# Patient Record
Sex: FEMALE | Race: ASIAN | NOT HISPANIC OR LATINO | Employment: FULL TIME | URBAN - METROPOLITAN AREA
[De-identification: names, ages, dates, MRNs, and addresses within clinical notes are randomized per-mention and may not be internally consistent; named-entity substitution may affect disease eponyms.]

---

## 2018-07-11 ENCOUNTER — OFFICE VISIT (OUTPATIENT)
Dept: OPHTHALMOLOGY | Facility: CLINIC | Age: 63
End: 2018-07-11
Payer: COMMERCIAL

## 2018-07-11 DIAGNOSIS — H25.12 NUCLEAR SCLEROSIS OF LEFT EYE: Primary | ICD-10-CM

## 2018-07-11 PROCEDURE — 92004 COMPRE OPH EXAM NEW PT 1/>: CPT | Mod: S$GLB,,, | Performed by: OPHTHALMOLOGY

## 2018-07-11 PROCEDURE — 99999 PR PBB SHADOW E&M-NEW PATIENT-LVL II: CPT | Mod: PBBFAC,,, | Performed by: OPHTHALMOLOGY

## 2018-07-11 PROCEDURE — 92136 OPHTHALMIC BIOMETRY: CPT | Mod: 26,LT,S$GLB, | Performed by: OPHTHALMOLOGY

## 2018-07-11 RX ORDER — TETRACAINE HYDROCHLORIDE 5 MG/ML
1 SOLUTION OPHTHALMIC
Status: CANCELLED | OUTPATIENT
Start: 2018-07-11

## 2018-07-11 RX ORDER — TROPICAMIDE 10 MG/ML
1 SOLUTION/ DROPS OPHTHALMIC
Status: CANCELLED | OUTPATIENT
Start: 2018-07-11

## 2018-07-11 RX ORDER — MOXIFLOXACIN 5 MG/ML
1 SOLUTION/ DROPS OPHTHALMIC
Status: CANCELLED | OUTPATIENT
Start: 2018-07-11

## 2018-07-11 RX ORDER — PHENYLEPHRINE HYDROCHLORIDE 25 MG/ML
1 SOLUTION/ DROPS OPHTHALMIC
Status: CANCELLED | OUTPATIENT
Start: 2018-07-11

## 2018-07-11 NOTE — PROGRESS NOTES
HPI     Pt was referred by Dr. Lopez for a cataract evaluation OU.   Pt states that she was told that she has a cataract in her OS that is   ready to be removed. Pt insurance changed and that's why she is at   [facility]. Pt denies any signs of flashes or floaters OU. No pain or   discomfort OU. Sometimes OU will get red. Pt will use tears as needed.     Last edited by Tena Mcduffie on 7/11/2018  3:21 PM. (History)            Assessment /Plan     For exam results, see Encounter Report.    Nuclear sclerosis of left eye  -     IOL Master - MOD 26 - OS - Left eye      Visually Significant Cataract: Patient reports decreased vision consistent with the clinical amount of lenticular opacity, which reaches the level of visual significance and affects activities of daily living. Risks, benefits, and alternatives to cataract surgery were discussed and the consent reviewed. IOL options were discussed, including ATIOLs and the associated side effects and additional patient cost associated with them.   IOL Selections:   Right eye  IOL: na     Left eye  IOL: pcboo 23.5    Pt wishes to have left eye done first.

## 2018-07-20 ENCOUNTER — TELEPHONE (OUTPATIENT)
Dept: OPHTHALMOLOGY | Facility: CLINIC | Age: 63
End: 2018-07-20

## 2018-07-20 DIAGNOSIS — H25.12 NUCLEAR SCLEROTIC CATARACT OF LEFT EYE: Primary | ICD-10-CM

## 2018-07-24 ENCOUNTER — TELEPHONE (OUTPATIENT)
Dept: OPHTHALMOLOGY | Facility: CLINIC | Age: 63
End: 2018-07-24

## 2018-07-24 NOTE — TELEPHONE ENCOUNTER
----- Message from Glenda Huizar MA sent at 7/23/2018  3:56 PM CDT -----  Needs to tereza sx-wants end of oct or 1st of nov- call home #

## 2018-07-30 ENCOUNTER — TELEPHONE (OUTPATIENT)
Dept: OPHTHALMOLOGY | Facility: CLINIC | Age: 63
End: 2018-07-30

## 2018-07-30 NOTE — TELEPHONE ENCOUNTER
----- Message from María Alvares sent at 7/30/2018 12:59 PM CDT -----  Contact: Krishna Henao calling to reschedule her surgery back to September if it is still available.  She says her plans have changed and she can do it earlier rather than later. She can be reachedat [phone]

## 2018-09-12 ENCOUNTER — TELEPHONE (OUTPATIENT)
Dept: OPHTHALMOLOGY | Facility: CLINIC | Age: 63
End: 2018-09-12

## 2018-09-12 NOTE — TELEPHONE ENCOUNTER
----- Message from María Alvares sent at 8/30/2018  3:37 PM CDT -----  Contact: Krishna  Pt wants to change the lens for her surgery and pay more.  Please contact her at 9am or afternoon to discuss further at [phone]

## 2018-09-19 ENCOUNTER — OFFICE VISIT (OUTPATIENT)
Dept: OPHTHALMOLOGY | Facility: CLINIC | Age: 63
End: 2018-09-19
Payer: COMMERCIAL

## 2018-09-19 DIAGNOSIS — H25.12 NUCLEAR SCLEROTIC CATARACT OF LEFT EYE: Primary | ICD-10-CM

## 2018-09-19 PROCEDURE — 99999 PR PBB SHADOW E&M-EST. PATIENT-LVL II: CPT | Mod: PBBFAC,,, | Performed by: OPHTHALMOLOGY

## 2018-09-19 PROCEDURE — 92012 INTRM OPH EXAM EST PATIENT: CPT | Mod: S$GLB,,, | Performed by: OPHTHALMOLOGY

## 2018-09-19 NOTE — PROGRESS NOTES
HPI     Pt states that vision seems to be stable OU for distance and near since   last visit. Pt is here today to go over lens options with Dr. Wilde. Pt   denies any signs of flashes or floaters OU. No pain or discomfort OU. Pt   confirms using tears as needed. Pt complains about glare a lot at night.     Last edited by Tena Mcduffie on 9/19/2018 11:01 AM. (History)            Assessment /Plan     For exam results, see Encounter Report.    Nuclear sclerotic cataract of left eye      HPI     Pt was referred by Dr. Lopez for a cataract evaluation OU.   Pt states that she was told that she has a cataract in her OS that is   ready to be removed. Pt insurance changed and that's why she is at   [facility]. Pt denies any signs of flashes or floaters OU. No pain or   discomfort OU. Sometimes OU will get red. Pt will use tears as needed.     Last edited by Tena Mcduffie on 7/11/2018  3:21 PM. (History)            Assessment /Plan     For exam results, see Encounter Report.    Nuclear sclerosis of left eye  -     IOL Master - MOD 26 - OS - Left eye      Visually Significant Cataract: Patient reports decreased vision consistent with the clinical amount of lenticular opacity, which reaches the level of visual significance and affects activities of daily living. Risks, benefits, and alternatives to cataract surgery were discussed and the consent reviewed. IOL options were discussed, including ATIOLs and the associated side effects and additional patient cost associated with them.   IOL Selections:   Right eye  IOL: na     Left eye  IOL: pcboo 26.0 (-2.00 target)    Pt wishes to have left eye done first.  The patient expresses a desire to reduce spectacle dependence. I reviewed various IOL and LASER refractive surgical options and we will attempt to minimize spectacle dependence by managing astigmatism and optimizing IOL selection. Femtosecond LASER assisted cataract surgery (FLACS) technology was explained to the patient  with educational videos and discussion.  The patient voices understanding and wishes to implement this technology during the cataract procedure.  I explained the increased precision of the LASER versus manual techniques, especially as it relates to astigmatism reduction with arcuate incisions.  I emphasized that although our goal is to reduce the need for refractive correction after surgery, there may still be a need for spectacle correction to achieve optimal visual acuity, and that a reasonable range of functional vision should be the expectation.  No guarantees are made about post operative refraction or visual acuity, as the eye may heal in unpredictable ways, and the standard risks, benefits, and alternatives to cataract surgery were explained.  The patient understands that the refractive portions of this cataract procedure are not covered by insurance, and that there is an out of pocket expense of $1250 per eye. I also explained that even though our pre-operative plan is to utilize advanced refractive technologies during surgery, that I may decide to eliminate part or all of this plan if surgical challenges or complications arise, or I feel that it is not in the patient's best interest. Consent forms and an ABN form were given to the patient to review.      Catalys Parameters:    Left Eye:   JACOBO:  12mm   ?Need to esha patient sitting up?: n  Capsulotomy: Scanned Capsule  rdGrdrrdarddrderd:rd rd3rd Arcuate:  Anterior Penetrating:  Symmetric.  Length: 25  at  Axis: 130  Incisions:  OFF

## 2018-09-19 NOTE — H&P (VIEW-ONLY)
HPI     Pt states that vision seems to be stable OU for distance and near since   last visit. Pt is here today to go over lens options with Dr. Wilde. Pt   denies any signs of flashes or floaters OU. No pain or discomfort OU. Pt   confirms using tears as needed. Pt complains about glare a lot at night.     Last edited by Tena Mcduffie on 9/19/2018 11:01 AM. (History)            Assessment /Plan     For exam results, see Encounter Report.    Nuclear sclerotic cataract of left eye      HPI     Pt was referred by Dr. Lopez for a cataract evaluation OU.   Pt states that she was told that she has a cataract in her OS that is   ready to be removed. Pt insurance changed and that's why she is at   [facility]. Pt denies any signs of flashes or floaters OU. No pain or   discomfort OU. Sometimes OU will get red. Pt will use tears as needed.     Last edited by Tena Mcduffie on 7/11/2018  3:21 PM. (History)            Assessment /Plan     For exam results, see Encounter Report.    Nuclear sclerosis of left eye  -     IOL Master - MOD 26 - OS - Left eye      Visually Significant Cataract: Patient reports decreased vision consistent with the clinical amount of lenticular opacity, which reaches the level of visual significance and affects activities of daily living. Risks, benefits, and alternatives to cataract surgery were discussed and the consent reviewed. IOL options were discussed, including ATIOLs and the associated side effects and additional patient cost associated with them.   IOL Selections:   Right eye  IOL: na     Left eye  IOL: pcboo 26.0 (-2.00 target)    Pt wishes to have left eye done first.  The patient expresses a desire to reduce spectacle dependence. I reviewed various IOL and LASER refractive surgical options and we will attempt to minimize spectacle dependence by managing astigmatism and optimizing IOL selection. Femtosecond LASER assisted cataract surgery (FLACS) technology was explained to the patient  with educational videos and discussion.  The patient voices understanding and wishes to implement this technology during the cataract procedure.  I explained the increased precision of the LASER versus manual techniques, especially as it relates to astigmatism reduction with arcuate incisions.  I emphasized that although our goal is to reduce the need for refractive correction after surgery, there may still be a need for spectacle correction to achieve optimal visual acuity, and that a reasonable range of functional vision should be the expectation.  No guarantees are made about post operative refraction or visual acuity, as the eye may heal in unpredictable ways, and the standard risks, benefits, and alternatives to cataract surgery were explained.  The patient understands that the refractive portions of this cataract procedure are not covered by insurance, and that there is an out of pocket expense of $1250 per eye. I also explained that even though our pre-operative plan is to utilize advanced refractive technologies during surgery, that I may decide to eliminate part or all of this plan if surgical challenges or complications arise, or I feel that it is not in the patient's best interest. Consent forms and an ABN form were given to the patient to review.      Catalys Parameters:    Left Eye:   JACOBO:  12mm   ?Need to esha patient sitting up?: n  Capsulotomy: Scanned Capsule  stGstrstastdstest:st st1st Arcuate:  Anterior Penetrating:  Symmetric.  Length: 25  at  Axis: 130  Incisions:  OFF

## 2018-09-20 ENCOUNTER — TELEPHONE (OUTPATIENT)
Dept: OPHTHALMOLOGY | Facility: CLINIC | Age: 63
End: 2018-09-20

## 2018-09-20 NOTE — TELEPHONE ENCOUNTER
Left message giving arrival time for surgery as 8 am.  Nothing to eat or drink after 9 pm night before.  May have water/gatorade/powerade from 9 pm until leaves house morning of surgery.

## 2018-09-24 ENCOUNTER — ANESTHESIA (OUTPATIENT)
Dept: SURGERY | Facility: OTHER | Age: 63
End: 2018-09-24
Payer: COMMERCIAL

## 2018-09-24 ENCOUNTER — ANESTHESIA EVENT (OUTPATIENT)
Dept: SURGERY | Facility: OTHER | Age: 63
End: 2018-09-24
Payer: COMMERCIAL

## 2018-09-24 ENCOUNTER — HOSPITAL ENCOUNTER (OUTPATIENT)
Facility: OTHER | Age: 63
Discharge: HOME | End: 2018-09-24
Attending: OPHTHALMOLOGY | Admitting: OPHTHALMOLOGY
Payer: COMMERCIAL

## 2018-09-24 VITALS
TEMPERATURE: 98 F | SYSTOLIC BLOOD PRESSURE: 138 MMHG | BODY MASS INDEX: 17.99 KG/M2 | OXYGEN SATURATION: 100 % | HEIGHT: 65 IN | DIASTOLIC BLOOD PRESSURE: 76 MMHG | RESPIRATION RATE: 16 BRPM | HEART RATE: 74 BPM | WEIGHT: 108 LBS

## 2018-09-24 DIAGNOSIS — H25.12 NUCLEAR SCLEROSIS OF LEFT EYE: ICD-10-CM

## 2018-09-24 PROCEDURE — 66984 XCAPSL CTRC RMVL W/O ECP: CPT | Mod: LT,,, | Performed by: OPHTHALMOLOGY

## 2018-09-24 PROCEDURE — 25000003 PHARM REV CODE 250: Performed by: NURSE ANESTHETIST, CERTIFIED REGISTERED

## 2018-09-24 PROCEDURE — 71000015 HC POSTOP RECOV 1ST HR: Performed by: OPHTHALMOLOGY

## 2018-09-24 PROCEDURE — 37000008 HC ANESTHESIA 1ST 15 MINUTES: Performed by: OPHTHALMOLOGY

## 2018-09-24 PROCEDURE — V2632 POST CHMBR INTRAOCULAR LENS: HCPCS | Mod: WS | Performed by: OPHTHALMOLOGY

## 2018-09-24 PROCEDURE — 36000707: Performed by: OPHTHALMOLOGY

## 2018-09-24 PROCEDURE — 37000009 HC ANESTHESIA EA ADD 15 MINS: Performed by: OPHTHALMOLOGY

## 2018-09-24 PROCEDURE — 63600175 PHARM REV CODE 636 W HCPCS: Performed by: NURSE ANESTHETIST, CERTIFIED REGISTERED

## 2018-09-24 PROCEDURE — 36000706: Performed by: OPHTHALMOLOGY

## 2018-09-24 PROCEDURE — 66999 UNLISTED PX ANT SEGMENT EYE: CPT | Mod: ,,, | Performed by: OPHTHALMOLOGY

## 2018-09-24 PROCEDURE — 25000003 PHARM REV CODE 250: Performed by: OPHTHALMOLOGY

## 2018-09-24 DEVICE — LENS IOL ITEC PRELOAD 26.0D: Type: IMPLANTABLE DEVICE | Site: EYE | Status: FUNCTIONAL

## 2018-09-24 RX ORDER — MOXIFLOXACIN 5 MG/ML
1 SOLUTION/ DROPS OPHTHALMIC
Status: COMPLETED | OUTPATIENT
Start: 2018-09-24 | End: 2018-09-24

## 2018-09-24 RX ORDER — TETRACAINE HYDROCHLORIDE 5 MG/ML
SOLUTION OPHTHALMIC
Status: DISCONTINUED | OUTPATIENT
Start: 2018-09-24 | End: 2018-09-24 | Stop reason: HOSPADM

## 2018-09-24 RX ORDER — TETRACAINE HYDROCHLORIDE 5 MG/ML
1 SOLUTION OPHTHALMIC
Status: COMPLETED | OUTPATIENT
Start: 2018-09-24 | End: 2018-09-24

## 2018-09-24 RX ORDER — ACETAMINOPHEN 325 MG/1
650 TABLET ORAL EVERY 4 HOURS PRN
Status: DISCONTINUED | OUTPATIENT
Start: 2018-09-24 | End: 2018-09-24 | Stop reason: HOSPADM

## 2018-09-24 RX ORDER — MOXIFLOXACIN 5 MG/ML
SOLUTION/ DROPS OPHTHALMIC
Status: DISCONTINUED | OUTPATIENT
Start: 2018-09-24 | End: 2018-09-24 | Stop reason: HOSPADM

## 2018-09-24 RX ORDER — LIDOCAINE HYDROCHLORIDE 40 MG/ML
INJECTION, SOLUTION RETROBULBAR
Status: DISCONTINUED | OUTPATIENT
Start: 2018-09-24 | End: 2018-09-24 | Stop reason: HOSPADM

## 2018-09-24 RX ORDER — SODIUM CHLORIDE 9 MG/ML
INJECTION, SOLUTION INTRAVENOUS CONTINUOUS PRN
Status: DISCONTINUED | OUTPATIENT
Start: 2018-09-24 | End: 2018-09-24

## 2018-09-24 RX ORDER — TROPICAMIDE 10 MG/ML
1 SOLUTION/ DROPS OPHTHALMIC
Status: COMPLETED | OUTPATIENT
Start: 2018-09-24 | End: 2018-09-24

## 2018-09-24 RX ORDER — MIDAZOLAM HYDROCHLORIDE 1 MG/ML
INJECTION INTRAMUSCULAR; INTRAVENOUS
Status: DISCONTINUED | OUTPATIENT
Start: 2018-09-24 | End: 2018-09-24

## 2018-09-24 RX ORDER — PHENYLEPHRINE HYDROCHLORIDE 100 MG/ML
SOLUTION/ DROPS OPHTHALMIC
Status: DISCONTINUED | OUTPATIENT
Start: 2018-09-24 | End: 2018-09-24 | Stop reason: HOSPADM

## 2018-09-24 RX ORDER — PROPARACAINE HYDROCHLORIDE 5 MG/ML
1 SOLUTION/ DROPS OPHTHALMIC
Status: DISCONTINUED | OUTPATIENT
Start: 2018-09-24 | End: 2018-09-24 | Stop reason: HOSPADM

## 2018-09-24 RX ORDER — PHENYLEPHRINE HYDROCHLORIDE 25 MG/ML
1 SOLUTION/ DROPS OPHTHALMIC
Status: COMPLETED | OUTPATIENT
Start: 2018-09-24 | End: 2018-09-24

## 2018-09-24 RX ORDER — TERBINAFINE HYDROCHLORIDE 250 MG/1
250 TABLET ORAL DAILY
COMMUNITY
End: 2018-12-21

## 2018-09-24 RX ADMIN — MIDAZOLAM HYDROCHLORIDE 2 MG: 1 INJECTION, SOLUTION INTRAMUSCULAR; INTRAVENOUS at 09:09

## 2018-09-24 RX ADMIN — TETRACAINE HYDROCHLORIDE 1 DROP: 5 SOLUTION OPHTHALMIC at 08:09

## 2018-09-24 RX ADMIN — MOXIFLOXACIN 1 DROP: 5 SOLUTION/ DROPS OPHTHALMIC at 10:09

## 2018-09-24 RX ADMIN — MOXIFLOXACIN 1 DROP: 5 SOLUTION/ DROPS OPHTHALMIC at 08:09

## 2018-09-24 RX ADMIN — PHENYLEPHRINE HYDROCHLORIDE 1 DROP: 25 SOLUTION/ DROPS OPHTHALMIC at 08:09

## 2018-09-24 RX ADMIN — TROPICAMIDE 1 DROP: 10 SOLUTION/ DROPS OPHTHALMIC at 08:09

## 2018-09-24 RX ADMIN — SODIUM CHLORIDE: 9 INJECTION, SOLUTION INTRAVENOUS at 09:09

## 2018-09-24 NOTE — DISCHARGE SUMMARY
Outcome: Successful outpatient ophthalmic surgical procedure  Preprinted Instructions given to patient.  Regular diet.  Activity: No restrictions  Meds: see Med Rec  Condition: stable  Follow up: 1 day with Dr Wilde  Disposition: Home  Diagnosis: s/p eye surgery

## 2018-09-24 NOTE — DISCHARGE INSTRUCTIONS
Dwight Wilde MD  [facility]  Department of Ophthalmology      AFTER: Cataract Surgery:  Relax at home and DO NOT exert yourself for the rest of the day.  Plan to see Dr. Wilde tomorrow at the eye clinic:   [facility] [facility] [address]  [place] [address]    Refer to attached eye drop instruction sheet     Precautions:  DO NOT rub your eye.  You may resume moderate activity the day after surgery.  Wear protective sunglasses during the day and a shield at night for 1(one) week.  If you have pain, redness and decreased vision, call Dr. Wilde (or the on-call doctor after hours) @[phone].            Home Care Instructions for Eye Surgeries    1. ACTIVITY:  Limit your activity today. Relax at home and DO NOT exert yourself for the rest of the day. Increase activity gradually. You may return to work or school as directed by your physician.    2. DIET:  Drink plenty of fluids. Resume your normal diet unless instructed otherwise.    3. PAIN:  Expect a moderate amount of pain. If a prescription for pain is not sent home with you, you may take your commonly used pain reliever as directed. If this is not sufficient, call your physician. You may resume any other prescription medication unless otherwise directed by your physician.     Discuss any problem with your physician as soon as it arises. Do not Delay.      EMERGENCY- If you are unable to contact your physician, please go to the nearest Emergency Room.       Anesthesia: Monitored Anesthesia Care (MAC)    Anesthesia Safety  · Have an adult family member or friend drive you home after the procedure.  · For the first 24 hours after your surgery:  · Do not drive or use heavy equipment.  · Do not make important decisions or sign documents.  · Avoid alcohol.  · Have someone stay with you, if possible. They can watch for problems and help keep you safe.

## 2018-09-24 NOTE — ANESTHESIA POSTPROCEDURE EVALUATION
"Anesthesia Post Evaluation    Patient: Krishna Mariee    Procedure(s) Performed: Procedure(s) (LRB):  PHACOEMULSIFICATION, CATARACT (Left)  INSERTION, IOL PROSTHESIS (Left)    Final Anesthesia Type: MAC  Patient location during evaluation: OPS  Patient participation: Yes- Able to Participate  Level of consciousness: awake and alert and oriented  Post-procedure vital signs: reviewed and stable  Pain management: adequate  Airway patency: patent  PONV status at discharge: No PONV  Anesthetic complications: no      Cardiovascular status: stable  Respiratory status: unassisted, spontaneous ventilation and room air  Hydration status: euvolemic  Follow-up not needed.        Visit Vitals  /73 (BP Location: Left arm, Patient Position: Lying)   Pulse 77   Temp 36.6 °C (97.9 °F) (Oral)   Resp 18   Ht 5' 5" (1.651 m)   Wt 49 kg (108 lb)   SpO2 99%   Breastfeeding? No   BMI 17.97 kg/m²       Pain/Caroline Score: Pain Assessment Performed: Yes (9/24/2018  8:25 AM)  Presence of Pain: complains of pain/discomfort (9/24/2018  8:25 AM)        "

## 2018-09-24 NOTE — ANESTHESIA PREPROCEDURE EVALUATION
09/24/2018  Krishna Mariee is a 63 y.o., female.    Anesthesia Evaluation    I have reviewed the Patient Summary Reports.    I have reviewed the Nursing Notes.   I have reviewed the Medications.     Review of Systems  Anesthesia Hx:  Denies Family Hx of Anesthesia complications.   Denies Personal Hx of Anesthesia complications.   Social:  Non-Smoker    Hematology/Oncology:  Hematology Normal   Oncology Normal     EENT/Dental:EENT/Dental Normal   Cardiovascular:  Cardiovascular Normal     Pulmonary:  Pulmonary Normal    Renal/:  Renal/ Normal     Hepatic/GI:  Hepatic/GI Normal    Musculoskeletal:  Musculoskeletal Normal    Neurological:  Neurology Normal    Endocrine:   Hypothyroidism    Dermatological:  Skin Normal    Psych:  Psychiatric Normal           Physical Exam  General:  Cachexia      Dental:  Dental Findings: In tact        Mental Status:  Mental Status Findings:  Cooperative, Alert and Oriented         Anesthesia Plan  Type of Anesthesia, risks & benefits discussed:  Anesthesia Type:  MAC  Patient's Preference:   Intra-op Monitoring Plan: standard ASA monitors  Intra-op Monitoring Plan Comments:   Post Op Pain Control Plan:   Post Op Pain Control Plan Comments:   Induction:    Beta Blocker:         Informed Consent: Patient understands risks and agrees with Anesthesia plan.  Questions answered. Anesthesia consent signed with patient.  ASA Score: 2     Day of Surgery Review of History & Physical:    H&P update referred to the surgeon.         Ready For Surgery From Anesthesia Perspective.

## 2018-09-24 NOTE — PLAN OF CARE
Ajaymarquez Osbaldo Mariee has met all discharge criteria from Phase II. Vital Signs are stable, ambulating  without difficulty. Discharge instructions given, patient verbalized understanding. Discharged from facility via wheelchair in stable condition.

## 2018-09-24 NOTE — PROGRESS NOTES
Patient reports that she was pulling branches last night when one of the branches hit her in her head. Patient denies falling. Large bruise with swelling noted to right forehead. Patient alert and oriented x4, VSS, patient denies taking any recent blood thinnners. Dr. Wallace paged and notified of above. Stated that he would assess patient when she gets downstairs.

## 2018-09-24 NOTE — OP NOTE
SURGEON:  Dwight Wilde M.D.    PREOPERATIVE DIAGNOSIS:    Nuclear Sclerotic Cataract Left Eye    POSTOPERATIVE DIAGNOSIS:    Nuclear Sclerotic Cataract Left Eye    PROCEDURES:    Phacoemulsification with  intraocular lens, Left eye (84750)  With Femtosecond LASER assist    DATE OF SURGERY: 09/24/2018    IMPLANT: pcboo 26.0    ANESTHESIA:  MAC with topical Lidocaine    COMPLICATIONS:  None    ESTIMATED BLOOD LOSS: None    SPECIMENS: None    INDICATIONS:    The patient has a history of painless progressive visual loss and  difficulty with activities of daily living secondary to cataract formation.  After a thorough discussion of the risks, benefits, and alternatives to cataract surgery, including, but not limited to, the rare risks of infection, retinal detachment, hemorrhage, need for additional surgery, loss of vision, and even loss of the eye, the patient voices understanding and desires to proceed.    DESCRIPTION OF PROCEDURE:    After verification of consent and marking of the operative eye, the patient was positioned under the femtosecond LASER. Topical anesthetic drops were administered. A surgical timeout was initiated with verification of patient identifiers and the laser surgical plan. The eye was docked securely and the laser portion of the cataract procedure was carried out without complication.  The patient was returned to the pre-operative area to await the intraocular surgical portion of the cataract procedure.  The patients IOL calculations were reviewed, and the lens selection confirmed.   After verification and marking of the proper eye in the preop holding area, the patient was brought to the operating room in supine position where the eye was prepped and draped in standard sterile fashion with 5% Betadine and a lid speculum placed in the eye.   Topical 4% Lidocaine was used in addition to the preoperative anesthesia and the procedure was begun by the creation of a paracentesis incision through  which viscoelastic was used to fill the anterior chamber.  Next, a keratome blade was used to create a triplanar temporal clear corneal incision and a cystotome and Utrata forceps used to fashion a continuous curvilinear capsulorrhexis.  Hydrodissection was carried out using the [place] hydrodissection cannula and the nucleus was found to be mobile.  Phacoemulsification of the nucleus was carried out using a quick chop technique, and all remaining epinuclear and cortical material was removed.  The eye was then reformed with Viscoelastic and the  intraocular lens was implanted into the capsular bag.  All remaining viscoelastics were removed from the eye and at the end of the case the pupil was round, the lens was well-centered within the capsular bag and all wounds were found to be water tight.  Drops of Vigamox and Pred Forte were instilled and a shield was placed over the eye. The patient will follow up with Dr. Wilde in the morning.

## 2018-09-25 ENCOUNTER — OFFICE VISIT (OUTPATIENT)
Dept: OPHTHALMOLOGY | Facility: CLINIC | Age: 63
End: 2018-09-25
Attending: OPHTHALMOLOGY
Payer: COMMERCIAL

## 2018-09-25 DIAGNOSIS — Z98.890 POST-OPERATIVE STATE: Primary | ICD-10-CM

## 2018-09-25 DIAGNOSIS — H25.12 NUCLEAR SCLEROTIC CATARACT OF LEFT EYE: ICD-10-CM

## 2018-09-25 PROCEDURE — 99999 PR PBB SHADOW E&M-EST. PATIENT-LVL II: CPT | Mod: PBBFAC,,, | Performed by: OPHTHALMOLOGY

## 2018-09-25 PROCEDURE — 99024 POSTOP FOLLOW-UP VISIT: CPT | Mod: S$GLB,,, | Performed by: OPHTHALMOLOGY

## 2018-09-25 RX ORDER — PREDNISOLONE ACETATE-GATIFLOXACIN-BROMFENAC .75; 5; 1 MG/ML; MG/ML; MG/ML
1 SUSPENSION/ DROPS OPHTHALMIC 3 TIMES DAILY
COMMUNITY
End: 2018-12-21

## 2018-09-25 NOTE — PROGRESS NOTES
HPI     POD 1 Phaco w/IOL OS September 24, 2018    MEDS:    Prednisolone/Gatiflox/Bromfenac TID OS    Patient states she is doing well with no complaints.    Last edited by Laney Colby on 9/25/2018  8:59 AM. (History)            Assessment /Plan     For exam results, see Encounter Report.    Post-operative state    Nuclear sclerotic cataract of left eye      Slit lamp exam:  L/L: nl  K: clear, wound sealed  AC: 1+ cell  Lens: IOL centered and stable    POD1 s/p Phaco/IOL  Appropriate precautions and post op medications reviewed.  Patient instructed to call or come in if symptoms of redness, decreased vision, or pain are experienced.

## 2018-10-03 ENCOUNTER — OFFICE VISIT (OUTPATIENT)
Dept: OPHTHALMOLOGY | Facility: CLINIC | Age: 63
End: 2018-10-03
Payer: COMMERCIAL

## 2018-10-03 DIAGNOSIS — Z98.890 POST-OPERATIVE STATE: Primary | ICD-10-CM

## 2018-10-03 PROCEDURE — 99024 POSTOP FOLLOW-UP VISIT: CPT | Mod: S$GLB,,, | Performed by: OPHTHALMOLOGY

## 2018-10-03 PROCEDURE — 99999 PR PBB SHADOW E&M-EST. PATIENT-LVL II: CPT | Mod: PBBFAC,,, | Performed by: OPHTHALMOLOGY

## 2018-10-03 NOTE — PROGRESS NOTES
HPI     1 wk s/p Phaco w/IOL OS (September 24, 2018).  Pt feels as though she still has an astigmatism even though she did the   laser. Pt states she also still sees glare at night time.     Pt confirms using  PGB TID OS      Last edited by Tena Mcduffie on 10/3/2018  2:53 PM. (History)            Assessment /Plan     For exam results, see Encounter Report.    Post-operative state      Slit lamp exam:  L/L: nl  K: clear, wound sealed  AC: trace cell  Iris/Lens: IOL centered and stable    POW1 s/p phaco: Surgery healing well with no signs of infection or abnormal inflammation.   Monovision, needs driving glasses Rx.

## 2018-11-05 ENCOUNTER — OFFICE VISIT (OUTPATIENT)
Dept: OPTOMETRY | Facility: CLINIC | Age: 63
End: 2018-11-05
Payer: COMMERCIAL

## 2018-11-05 DIAGNOSIS — Z98.42 STATUS POST LEFT CATARACT EXTRACTION: Primary | ICD-10-CM

## 2018-11-05 PROCEDURE — 99999 PR PBB SHADOW E&M-EST. PATIENT-LVL II: CPT | Mod: PBBFAC,,, | Performed by: OPTOMETRIST

## 2018-11-05 PROCEDURE — 99024 POSTOP FOLLOW-UP VISIT: CPT | Mod: S$GLB,,, | Performed by: OPTOMETRIST

## 2018-11-05 NOTE — PROGRESS NOTES
HPI     Post-op Evaluation      Additional comments: 6 wk phaco OS              Comments     Last eye exam was 10/3/18 with Dr. Wilde.  Patient states can read small print since cataract sx OS. Was told by Dr. Wilde that she should get driving glasses for at night. Patient left PO   drops at daughter's house and started drops from 1st eye. Used drops   yesterday.   Patient denies diplopia, headaches, flashes/floaters, and pain.    09/24/2018 IMPLANT: pcboo 26.0 OS          Last edited by Katie Elias on 11/5/2018 10:06 AM. (History)            Assessment /Plan     For exam results, see Encounter Report.    Status post left cataract extraction  -     OCT- Retina            1.  Pt doing well.  Distance rx given.  Ok to stop post-op drops.  Eye health normal.  Return care to Dr. Lopez.

## 2018-12-21 ENCOUNTER — OFFICE VISIT (OUTPATIENT)
Dept: OBSTETRICS AND GYNECOLOGY | Facility: CLINIC | Age: 63
End: 2018-12-21
Payer: COMMERCIAL

## 2018-12-21 VITALS
SYSTOLIC BLOOD PRESSURE: 112 MMHG | WEIGHT: 103.63 LBS | BODY MASS INDEX: 17.27 KG/M2 | HEIGHT: 65 IN | DIASTOLIC BLOOD PRESSURE: 68 MMHG

## 2018-12-21 DIAGNOSIS — Z12.4 ENCOUNTER FOR PAPANICOLAOU SMEAR FOR CERVICAL CANCER SCREENING: ICD-10-CM

## 2018-12-21 DIAGNOSIS — Z01.419 ENCOUNTER FOR GYNECOLOGICAL EXAMINATION: Primary | ICD-10-CM

## 2018-12-21 DIAGNOSIS — Z11.51 ENCOUNTER FOR SCREENING FOR HUMAN PAPILLOMAVIRUS (HPV): ICD-10-CM

## 2018-12-21 DIAGNOSIS — Z12.31 BREAST CANCER SCREENING BY MAMMOGRAM: ICD-10-CM

## 2018-12-21 PROCEDURE — 99386 PREV VISIT NEW AGE 40-64: CPT | Mod: S$GLB,,, | Performed by: OBSTETRICS & GYNECOLOGY

## 2018-12-21 PROCEDURE — 99999 PR PBB SHADOW E&M-EST. PATIENT-LVL III: CPT | Mod: PBBFAC,,, | Performed by: OBSTETRICS & GYNECOLOGY

## 2018-12-21 PROCEDURE — 87624 HPV HI-RISK TYP POOLED RSLT: CPT

## 2018-12-21 PROCEDURE — 88175 CYTOPATH C/V AUTO FLUID REDO: CPT

## 2018-12-21 RX ORDER — IBANDRONATE SODIUM 150 MG/1
150 TABLET, FILM COATED ORAL
COMMUNITY
End: 2020-06-17

## 2018-12-21 NOTE — PROGRESS NOTES
Subjective:       Patient ID: Krishna Mariee is a 63 y.o. female.    Chief Complaint:  New pt. (Annual Exam, last pap 2017 negative, Last mammo 2017 negative [facility])      History of Present Illness  - here for annual. No complaints. Had pelvic u/s last year; fibroids have decreased in size. Denies vaginal bleeding. Saw Endocrinology (Dr. Blake Butterfield) who started her on monthly Boniva for osteoporosis. Has thyroid nodules but doesn't take meds. Started vitamins recommended by her son (a chiropractor); she reports that the nodules are smaller.    Past Medical History:   Diagnosis Date    Cataract     Leiomyoma of uterus     Thyroid nodule        Past Surgical History:   Procedure Laterality Date    CATARACT EXTRACTION W/  INTRAOCULAR LENS IMPLANT Left 2018    Dr. Wilde     SECTION      x 3    INSERTION, IOL PROSTHESIS Left 2018    Performed by Dwight Wilde MD at [facility] OR    PHACOEMULSIFICATION, CATARACT Left 2018    Performed by Dwight Wilde MD at [facility] OR         Current Outpatient Medications:     ibandronate (BONIVA) 150 mg tablet, Take 150 mg by mouth every 30 days., Disp: , Rfl:     Review of patient's allergies indicates:   Allergen Reactions    Aspirin        GYN & OB History  No LMP recorded. Patient is postmenopausal.   Date of Last Pap: No result found    OB History    Para Term  AB Living   3 3 3     3   SAB TAB Ectopic Multiple Live Births           3      # Outcome Date GA Lbr Costa/2nd Weight Sex Delivery Anes PTL Lv   3 Term            2 Term            1 Term                   Social History     Socioeconomic History    Marital status:      Spouse name: Not on file    Number of children: Not on file    Years of education: Not on file    Highest education level: Not on file   Social Needs    Financial resource strain: Not on file    Food insecurity - worry: Not on file    Food insecurity - inability: Not on file    Transportation needs - medical:  "Not on file    Transportation needs - non-medical: Not on file   Occupational History    Not on file   Tobacco Use    Smoking status: Never Smoker    Smokeless tobacco: Never Used   Substance and Sexual Activity    Alcohol use: No     Frequency: Never    Drug use: No    Sexual activity: Not on file   Other Topics Concern    Not on file   Social History Narrative    Not on file       Family History   Problem Relation Age of Onset    No Known Problems Son     No Known Problems Daughter     No Known Problems Daughter        Review of Systems  Review of Systems   Respiratory: Negative for shortness of breath.    Cardiovascular: Negative for chest pain and palpitations.   Gastrointestinal: Negative for blood in stool, nausea and vomiting.   Genitourinary:        - see HPI   Skin: Negative for rash and wound.   Allergic/Immunologic: Negative for immunocompromised state.   Neurological: Negative for dizziness and syncope.   Hematological: Negative for adenopathy.   Psychiatric/Behavioral: Negative for behavioral problems.        Objective:     Vitals:    12/21/18 0853   BP: 112/68   Weight: 47 kg (103 lb 9.9 oz)   Height: 5' 5" (1.651 m)       Physical Exam:   Constitutional: She is oriented to person, place, and time. She appears well-developed and well-nourished.        Pulmonary/Chest: Right breast exhibits no mass, no nipple discharge, no skin change, no tenderness and no swelling. Left breast exhibits no mass, no nipple discharge, no skin change, no tenderness and no swelling. Breasts are symmetrical.        Abdominal: Soft. She exhibits no distension. There is no tenderness.     Genitourinary: Vagina normal and uterus normal. There is no tenderness or lesion on the right labia. There is no tenderness or lesion on the left labia. Cervix is normal. Right adnexum displays no mass, no tenderness and no fullness. Left adnexum displays no mass, no tenderness and no fullness. No vaginal discharge found. " Additional cervical findings: pap smear done  Genitourinary Comments: Small atrophic introitus           Musculoskeletal: Moves all extremeties.       Neurological: She is alert and oriented to person, place, and time.     Psychiatric: She has a normal mood and affect.        Assessment/ Plan:     Orders Placed This Encounter    HPV High Risk Genotypes, PCR    Mammo Digital Screening Bilat w/ Jose Manuel    Liquid-based pap smear, screening       Krishna was seen today for new pt..    Diagnoses and all orders for this visit:    Encounter for gynecological examination    Breast cancer screening by mammogram  -     Mammo Digital Screening Bilat w/ Jose Manuel; Future    Encounter for screening for human papillomavirus (HPV)  -     HPV High Risk Genotypes, PCR    Encounter for Papanicolaou smear for cervical cancer screening  -     Liquid-based pap smear, screening    - patient will sign med release for me to obtain records from Dr. Bailey (previous Gyn) and [facility].    Follow-up in about 1 year (around 12/21/2019) for annual exam.

## 2018-12-26 ENCOUNTER — APPOINTMENT (OUTPATIENT)
Dept: RADIOLOGY | Facility: OTHER | Age: 63
End: 2018-12-26
Attending: OBSTETRICS & GYNECOLOGY
Payer: COMMERCIAL

## 2018-12-26 VITALS — WEIGHT: 103 LBS | BODY MASS INDEX: 17.16 KG/M2 | HEIGHT: 65 IN

## 2018-12-26 DIAGNOSIS — Z12.31 BREAST CANCER SCREENING BY MAMMOGRAM: ICD-10-CM

## 2018-12-26 PROCEDURE — 77063 BREAST TOMOSYNTHESIS BI: CPT | Mod: TC,PN

## 2018-12-26 PROCEDURE — 77063 BREAST TOMOSYNTHESIS BI: CPT | Mod: 26,,, | Performed by: RADIOLOGY

## 2018-12-26 PROCEDURE — 77067 SCR MAMMO BI INCL CAD: CPT | Mod: 26,,, | Performed by: RADIOLOGY

## 2018-12-28 LAB
HPV HR 12 DNA CVX QL NAA+PROBE: NEGATIVE
HPV16 AG SPEC QL: NEGATIVE
HPV18 DNA SPEC QL NAA+PROBE: NEGATIVE

## 2020-06-17 ENCOUNTER — OFFICE VISIT (OUTPATIENT)
Dept: OBSTETRICS AND GYNECOLOGY | Facility: CLINIC | Age: 65
End: 2020-06-17
Payer: COMMERCIAL

## 2020-06-17 VITALS
BODY MASS INDEX: 16.53 KG/M2 | HEIGHT: 65 IN | SYSTOLIC BLOOD PRESSURE: 100 MMHG | DIASTOLIC BLOOD PRESSURE: 60 MMHG | WEIGHT: 99.19 LBS

## 2020-06-17 DIAGNOSIS — Z12.31 VISIT FOR SCREENING MAMMOGRAM: ICD-10-CM

## 2020-06-17 DIAGNOSIS — Z12.31 BREAST CANCER SCREENING BY MAMMOGRAM: Primary | ICD-10-CM

## 2020-06-17 DIAGNOSIS — Z01.419 ENCOUNTER FOR GYNECOLOGICAL EXAMINATION: ICD-10-CM

## 2020-06-17 DIAGNOSIS — R63.4 WEIGHT LOSS: ICD-10-CM

## 2020-06-17 DIAGNOSIS — Z78.0 MENOPAUSE: ICD-10-CM

## 2020-06-17 PROCEDURE — 99999 PR PBB SHADOW E&M-EST. PATIENT-LVL III: CPT | Mod: PBBFAC,,, | Performed by: OBSTETRICS & GYNECOLOGY

## 2020-06-17 PROCEDURE — 99999 PR PBB SHADOW E&M-EST. PATIENT-LVL III: ICD-10-PCS | Mod: PBBFAC,,, | Performed by: OBSTETRICS & GYNECOLOGY

## 2020-06-17 PROCEDURE — 99396 PR PREVENTIVE VISIT,EST,40-64: ICD-10-PCS | Mod: S$PBB,,, | Performed by: OBSTETRICS & GYNECOLOGY

## 2020-06-17 PROCEDURE — 99213 OFFICE O/P EST LOW 20 MIN: CPT | Mod: PBBFAC,PN | Performed by: OBSTETRICS & GYNECOLOGY

## 2020-06-17 PROCEDURE — 99396 PREV VISIT EST AGE 40-64: CPT | Mod: S$PBB,,, | Performed by: OBSTETRICS & GYNECOLOGY

## 2020-06-17 RX ORDER — CHOLECALCIFEROL (VITAMIN D3) 25 MCG
1000 TABLET ORAL DAILY
COMMUNITY

## 2020-06-17 RX ORDER — PEDI MULTIVIT NO.12 W-FLUORIDE 0.25 MG
1 TABLET,CHEWABLE ORAL DAILY
COMMUNITY

## 2020-06-17 NOTE — PROGRESS NOTES
Subjective:       Patient ID: Krishna Mariee is a 65 y.o. female.    Chief Complaint:  Well Woman (Annual  (previous Dr Pepper)  last pap/hpv 18, Negative  --  last mmg 18, birads  --  colonoscopy  ? Polyp (gila) )      History of Present Illness.  Krishna Mariee is a 65 y.o. female.  She has no breast or urinary symptoms.  She has no postcoital bleeding, pelvic pain or vaginal discharge.  She complains of weight loss    Current HRT Regimen:    GYN & OB History  No LMP recorded. Patient is postmenopausal.   Pap: 2019 Normal HPV  negative  Mammogram: 18 Normal  Colonoscopy:  - polyp  DEXA: No  PCP:  Dr. Porter  Routine Labs:   2019    OB History    Para Term  AB Living   3 3 3     3   SAB TAB Ectopic Multiple Live Births           3      # Outcome Date GA Lbr Costa/2nd Weight Sex Delivery Anes PTL Lv   3 Term  40w0d  3.175 kg (7 lb) F CS-LTranv   JUAN   2 Term  40w0d  3.345 kg (7 lb 6 oz) F CS-LTranv   JUAN   1 Term  40w0d  3.572 kg (7 lb 14 oz) M CS-LTranv   JUAN      Obstetric Comments   Menarche ~ 15       Past Medical History:   Diagnosis Date    Cataract     Leiomyoma of uterus     Menopause     Thyroid nodule     Diet Controlled      Past Surgical History:   Procedure Laterality Date    CATARACT EXTRACTION W/  INTRAOCULAR LENS IMPLANT Left 2018    Dr. Wilde     SECTION      x 3    INTRAOCULAR PROSTHESES INSERTION Left 2018    Procedure: INSERTION, IOL PROSTHESIS;  Surgeon: Dwight Wilde MD;  Location: [facility] OR;  Service: Ophthalmology;  Laterality: Left;    PHACOEMULSIFICATION OF CATARACT Left 2018    Procedure: PHACOEMULSIFICATION, CATARACT;  Surgeon: Dwight Wilde MD;  Location: [facility] OR;  Service: Ophthalmology;  Laterality: Left;     Family History   Problem Relation Age of Onset    No Known Problems Son     No Known Problems Daughter     No Known Problems Daughter     Breast cancer Neg Hx     Colon cancer Neg  "Hx     Ovarian cancer Neg Hx      Social History     Tobacco Use    Smoking status: Never Smoker    Smokeless tobacco: Never Used   Substance Use Topics    Alcohol use: No     Frequency: Never    Drug use: No       Current Outpatient Medications:     DAILY MULTI-VITAMIN ORAL, Take by mouth., Disp: , Rfl:     omega-3 fatty acids fish oil (OMEGA-3 2100) 1,050-1,200 mg Cap capsule, Take 1 capsule by mouth once daily., Disp: , Rfl:     UNABLE TO FIND, **  VITAMIN FOR THYROID, Disp: , Rfl:     vitamin D (VITAMIN D3) 1000 units Tab, Take 1,000 Units by mouth once daily., Disp: , Rfl:     Review of patient's allergies indicates:   Allergen Reactions    Aspirin Nausea Only       Review of Systems  Review of Systems   Constitutional: Negative for fatigue.   HENT: Negative for trouble swallowing.    Eyes: Negative for visual disturbance.   Respiratory: Negative for cough and shortness of breath.    Cardiovascular: Negative for chest pain.   Gastrointestinal: Negative for abdominal distention, abdominal pain, blood in stool, nausea and vomiting.   Genitourinary: Negative for difficulty urinating, dyspareunia, dysuria, flank pain, frequency, hematuria, pelvic pain, urgency, vaginal bleeding, vaginal discharge and vaginal pain.   Musculoskeletal: Negative for arthralgias.   Skin: Negative for rash.   Neurological: Negative for dizziness and headaches.   Psychiatric/Behavioral: Negative for sleep disturbance. The patient is not nervous/anxious.         Objective:     Vitals:    06/17/20 1127   BP: 100/60   Weight: 45 kg (99 lb 3.3 oz)   Height: 5' 5" (1.651 m)   PainSc: 0-No pain     Body mass index is 16.51 kg/m².    Physical Exam:   Constitutional: She is oriented to person, place, and time. She appears well-developed and well-nourished.    HENT:   Head: Normocephalic.     Neck: Normal range of motion. No thyromegaly present.     Pulmonary/Chest: Right breast exhibits no mass, no nipple discharge, no skin change, no " tenderness and no swelling. Left breast exhibits no mass, no nipple discharge, no skin change, no tenderness and no swelling. Breasts are symmetrical.        Abdominal: Soft. Normal appearance and bowel sounds are normal. She exhibits no distension. There is no abdominal tenderness.     Genitourinary:    Vagina and uterus normal.      Pelvic exam was performed with patient supine.   There is no rash, tenderness, lesion or injury on the right labia. There is no rash, tenderness, lesion or injury on the left labia. Cervix is normal. Right adnexum displays no mass, no tenderness and no fullness. Left adnexum displays no mass, no tenderness and no fullness. No erythema in the vagina. Cervix exhibits no motion tenderness and no discharge.           Musculoskeletal: Normal range of motion.      Lymphadenopathy:        Right: No supraclavicular adenopathy present.        Left: No supraclavicular adenopathy present.    Neurological: She is alert and oriented to person, place, and time.    Skin: Skin is warm and dry.    Psychiatric: She has a normal mood and affect.        Assessment/ Plan:     Breast cancer screening by mammogram  -     Mammo Digital Screening Bilat w/ Jose Manuel; Future; Expected date: 06/17/2020    Encounter for gynecological examination    Menopause  -     DXA Bone Density Spine And Hip; Future; Expected date: 06/17/2020    Visit for screening mammogram      Recommend f/u with PCP for weight loss  Routine pap smears.  Self breast exam and mammography discussed  Routine colonoscopy discussed.  Diet and exercise discussed.  Routine bone mineral density testing.  Yearly influenza vaccination discussed.  Follow-up with me in 1 year

## 2020-07-21 ENCOUNTER — LAB VISIT (OUTPATIENT)
Dept: PRIMARY CARE CLINIC | Facility: OTHER | Age: 65
End: 2020-07-21
Attending: INTERNAL MEDICINE
Payer: COMMERCIAL

## 2020-07-21 DIAGNOSIS — Z03.818 ENCOUNTER FOR OBSERVATION FOR SUSPECTED EXPOSURE TO OTHER BIOLOGICAL AGENTS RULED OUT: ICD-10-CM

## 2020-07-21 PROCEDURE — U0003 INFECTIOUS AGENT DETECTION BY NUCLEIC ACID (DNA OR RNA); SEVERE ACUTE RESPIRATORY SYNDROME CORONAVIRUS 2 (SARS-COV-2) (CORONAVIRUS DISEASE [COVID-19]), AMPLIFIED PROBE TECHNIQUE, MAKING USE OF HIGH THROUGHPUT TECHNOLOGIES AS DESCRIBED BY CMS-2020-01-R: HCPCS

## 2020-07-24 LAB — SARS-COV-2 RNA RESP QL NAA+PROBE: NEGATIVE

## 2020-07-31 ENCOUNTER — TELEPHONE (OUTPATIENT)
Dept: OBSTETRICS AND GYNECOLOGY | Facility: CLINIC | Age: 65
End: 2020-07-31

## 2020-07-31 NOTE — TELEPHONE ENCOUNTER
Pt was called, explained that she only gets pap smears every 3 yrs. Her last pap/hpv 12/2018 and will get another pap smear 12/2021    Pt understood

## 2021-01-25 ENCOUNTER — LAB VISIT (OUTPATIENT)
Dept: PRIMARY CARE CLINIC | Facility: OTHER | Age: 66
End: 2021-01-25
Attending: INTERNAL MEDICINE
Payer: MEDICARE

## 2021-01-25 DIAGNOSIS — R09.81 CONGESTION OF NASAL SINUS: ICD-10-CM

## 2021-01-25 DIAGNOSIS — R09.89 CHEST CONGESTION: ICD-10-CM

## 2021-01-25 PROCEDURE — U0003 INFECTIOUS AGENT DETECTION BY NUCLEIC ACID (DNA OR RNA); SEVERE ACUTE RESPIRATORY SYNDROME CORONAVIRUS 2 (SARS-COV-2) (CORONAVIRUS DISEASE [COVID-19]), AMPLIFIED PROBE TECHNIQUE, MAKING USE OF HIGH THROUGHPUT TECHNOLOGIES AS DESCRIBED BY CMS-2020-01-R: HCPCS

## 2021-01-26 LAB — SARS-COV-2 RNA RESP QL NAA+PROBE: DETECTED

## 2021-01-30 DIAGNOSIS — U07.1 COVID-19 VIRUS DETECTED: ICD-10-CM

## 2021-04-15 ENCOUNTER — PATIENT MESSAGE (OUTPATIENT)
Dept: RESEARCH | Facility: HOSPITAL | Age: 66
End: 2021-04-15

## 2021-08-09 ENCOUNTER — TELEPHONE (OUTPATIENT)
Dept: OBSTETRICS AND GYNECOLOGY | Facility: CLINIC | Age: 66
End: 2021-08-09

## 2021-08-13 ENCOUNTER — TELEPHONE (OUTPATIENT)
Dept: OBSTETRICS AND GYNECOLOGY | Facility: CLINIC | Age: 66
End: 2021-08-13

## 2021-11-11 ENCOUNTER — OFFICE VISIT (OUTPATIENT)
Dept: OBSTETRICS AND GYNECOLOGY | Facility: CLINIC | Age: 66
End: 2021-11-11
Attending: OBSTETRICS & GYNECOLOGY
Payer: MEDICARE

## 2021-11-11 VITALS
HEIGHT: 65 IN | DIASTOLIC BLOOD PRESSURE: 80 MMHG | WEIGHT: 100.56 LBS | BODY MASS INDEX: 16.75 KG/M2 | SYSTOLIC BLOOD PRESSURE: 125 MMHG

## 2021-11-11 DIAGNOSIS — Z12.31 BREAST CANCER SCREENING BY MAMMOGRAM: ICD-10-CM

## 2021-11-11 DIAGNOSIS — Z01.419 ENCOUNTER FOR GYNECOLOGICAL EXAMINATION: Primary | ICD-10-CM

## 2021-11-11 PROCEDURE — G0101 CA SCREEN;PELVIC/BREAST EXAM: HCPCS | Mod: S$PBB,,, | Performed by: OBSTETRICS & GYNECOLOGY

## 2021-11-11 PROCEDURE — G0101 CA SCREEN;PELVIC/BREAST EXAM: HCPCS | Mod: PBBFAC,PN | Performed by: OBSTETRICS & GYNECOLOGY

## 2021-11-11 PROCEDURE — 99213 OFFICE O/P EST LOW 20 MIN: CPT | Mod: PBBFAC,PN | Performed by: OBSTETRICS & GYNECOLOGY

## 2021-11-11 PROCEDURE — 99999 PR PBB SHADOW E&M-EST. PATIENT-LVL III: CPT | Mod: PBBFAC,,, | Performed by: OBSTETRICS & GYNECOLOGY

## 2021-11-11 PROCEDURE — G0101 PR CA SCREEN;PELVIC/BREAST EXAM: ICD-10-PCS | Mod: S$PBB,,, | Performed by: OBSTETRICS & GYNECOLOGY

## 2021-11-11 PROCEDURE — 99999 PR PBB SHADOW E&M-EST. PATIENT-LVL III: ICD-10-PCS | Mod: PBBFAC,,, | Performed by: OBSTETRICS & GYNECOLOGY

## 2021-11-11 RX ORDER — IBANDRONATE SODIUM 150 MG/1
150 TABLET, FILM COATED ORAL
Qty: 3 TABLET | Refills: 3 | Status: SHIPPED | OUTPATIENT
Start: 2021-11-11 | End: 2022-10-26

## 2021-11-11 RX ORDER — ALENDRONATE SODIUM 70 MG/1
70 TABLET ORAL
COMMUNITY
Start: 2021-07-13 | End: 2021-11-11

## 2022-01-08 ENCOUNTER — LAB VISIT (OUTPATIENT)
Dept: PRIMARY CARE CLINIC | Facility: OTHER | Age: 67
End: 2022-01-08
Attending: INTERNAL MEDICINE
Payer: MEDICARE

## 2022-01-08 ENCOUNTER — IMMUNIZATION (OUTPATIENT)
Dept: INTERNAL MEDICINE | Facility: CLINIC | Age: 67
End: 2022-01-08
Payer: MEDICARE

## 2022-01-08 DIAGNOSIS — Z23 NEED FOR VACCINATION: Primary | ICD-10-CM

## 2022-01-08 DIAGNOSIS — Z20.822 ENCOUNTER FOR LABORATORY TESTING FOR COVID-19 VIRUS: ICD-10-CM

## 2022-01-08 PROCEDURE — 0004A COVID-19, MRNA, LNP-S, PF, 30 MCG/0.3 ML DOSE VACCINE: CPT | Mod: PBBFAC,CV19

## 2022-01-08 PROCEDURE — U0003 INFECTIOUS AGENT DETECTION BY NUCLEIC ACID (DNA OR RNA); SEVERE ACUTE RESPIRATORY SYNDROME CORONAVIRUS 2 (SARS-COV-2) (CORONAVIRUS DISEASE [COVID-19]), AMPLIFIED PROBE TECHNIQUE, MAKING USE OF HIGH THROUGHPUT TECHNOLOGIES AS DESCRIBED BY CMS-2020-01-R: HCPCS | Performed by: INTERNAL MEDICINE

## 2022-01-08 NOTE — PROGRESS NOTES
Subjective:       Patient ID: Krishna Mariee is a 66 y.o. female.    Chief Complaint: No chief complaint on file.    HPI  Review of Systems    Objective:      Physical Exam    Assessment:       1. Encounter for laboratory testing for COVID-19 virus           swab collected, consent given     Plan:

## 2022-01-10 LAB
SARS-COV-2 RNA RESP QL NAA+PROBE: NOT DETECTED
SARS-COV-2- CYCLE NUMBER: 33

## 2022-01-26 ENCOUNTER — APPOINTMENT (OUTPATIENT)
Dept: RADIOLOGY | Facility: OTHER | Age: 67
End: 2022-01-26
Attending: OBSTETRICS & GYNECOLOGY
Payer: MEDICARE

## 2022-01-26 VITALS — WEIGHT: 100.5 LBS | HEIGHT: 65 IN | BODY MASS INDEX: 16.75 KG/M2

## 2022-01-26 PROCEDURE — 77063 BREAST TOMOSYNTHESIS BI: CPT | Mod: 26,,, | Performed by: RADIOLOGY

## 2022-01-26 PROCEDURE — 77063 MAMMO DIGITAL SCREENING BILAT WITH TOMO: ICD-10-PCS | Mod: 26,,, | Performed by: RADIOLOGY

## 2022-01-26 PROCEDURE — 77067 MAMMO DIGITAL SCREENING BILAT WITH TOMO: ICD-10-PCS | Mod: 26,,, | Performed by: RADIOLOGY

## 2022-01-26 PROCEDURE — 77067 SCR MAMMO BI INCL CAD: CPT | Mod: 26,,, | Performed by: RADIOLOGY

## 2022-01-26 PROCEDURE — 77067 SCR MAMMO BI INCL CAD: CPT | Mod: TC,PN

## 2022-01-26 PROCEDURE — 77063 BREAST TOMOSYNTHESIS BI: CPT | Mod: TC,PN

## 2025-05-23 ENCOUNTER — HOSPITAL ENCOUNTER (OUTPATIENT)
Facility: HOSPITAL | Age: 70
Discharge: HOME | End: 2025-05-24
Attending: EMERGENCY MEDICINE
Payer: MEDICARE

## 2025-05-23 DIAGNOSIS — N17.9 AKI (ACUTE KIDNEY INJURY): ICD-10-CM

## 2025-05-23 DIAGNOSIS — R07.9 CHEST PAIN: ICD-10-CM

## 2025-05-23 DIAGNOSIS — N20.1 RIGHT URETERAL STONE: Primary | ICD-10-CM

## 2025-05-23 PROBLEM — E11.9 DIABETES: Status: ACTIVE | Noted: 2025-05-23

## 2025-05-23 PROBLEM — K21.9 GASTROESOPHAGEAL REFLUX DISEASE: Status: ACTIVE | Noted: 2017-05-24

## 2025-05-23 LAB
ABSOLUTE EOSINOPHIL (OHS): 0.04 K/UL
ABSOLUTE MONOCYTE (OHS): 0.84 K/UL (ref 0.3–1)
ABSOLUTE NEUTROPHIL COUNT (OHS): 6.04 K/UL (ref 1.8–7.7)
ALBUMIN SERPL BCP-MCNC: 3.8 G/DL (ref 3.5–5.2)
ALP SERPL-CCNC: 82 UNIT/L (ref 40–150)
ALT SERPL W/O P-5'-P-CCNC: 16 UNIT/L (ref 10–44)
ANION GAP (OHS): 9 MMOL/L (ref 8–16)
AST SERPL-CCNC: 22 UNIT/L (ref 11–45)
BACTERIA #/AREA URNS AUTO: NORMAL /HPF
BASOPHILS # BLD AUTO: 0.02 K/UL
BASOPHILS NFR BLD AUTO: 0.2 %
BILIRUB SERPL-MCNC: 0.9 MG/DL (ref 0.1–1)
BILIRUB UR QL STRIP.AUTO: NEGATIVE
BUN SERPL-MCNC: 25 MG/DL (ref 8–23)
CALCIUM SERPL-MCNC: 10.4 MG/DL (ref 8.7–10.5)
CHLORIDE SERPL-SCNC: 102 MMOL/L (ref 95–110)
CLARITY UR: CLEAR
CO2 SERPL-SCNC: 23 MMOL/L (ref 23–29)
COLOR UR AUTO: YELLOW
CREAT SERPL-MCNC: 1.4 MG/DL (ref 0.5–1.4)
ERYTHROCYTE [DISTWIDTH] IN BLOOD BY AUTOMATED COUNT: 13.1 % (ref 11.5–14.5)
GFR SERPLBLD CREATININE-BSD FMLA CKD-EPI: 41 ML/MIN/1.73/M2
GLUCOSE SERPL-MCNC: 137 MG/DL (ref 70–110)
GLUCOSE UR QL STRIP: NEGATIVE
HCT VFR BLD AUTO: 40.8 % (ref 37–48.5)
HCV AB SERPL QL IA: NORMAL
HGB BLD-MCNC: 12.8 GM/DL (ref 12–16)
HGB UR QL STRIP: ABNORMAL
HIV 1+2 AB+HIV1 P24 AG SERPL QL IA: NORMAL
HOLD SPECIMEN: NORMAL
HOLD SPECIMEN: NORMAL
IMM GRANULOCYTES # BLD AUTO: 0.02 K/UL (ref 0–0.04)
IMM GRANULOCYTES NFR BLD AUTO: 0.2 % (ref 0–0.5)
KETONES UR QL STRIP: NEGATIVE
LACTATE SERPL-SCNC: 1 MMOL/L (ref 0.5–2.2)
LEUKOCYTE ESTERASE UR QL STRIP: NEGATIVE
LYMPHOCYTES # BLD AUTO: 1.29 K/UL (ref 1–4.8)
MCH RBC QN AUTO: 28.6 PG (ref 27–31)
MCHC RBC AUTO-ENTMCNC: 31.4 G/DL (ref 32–36)
MCV RBC AUTO: 91 FL (ref 82–98)
MICROSCOPIC COMMENT: NORMAL
NITRITE UR QL STRIP: NEGATIVE
NUCLEATED RBC (/100WBC) (OHS): 0 /100 WBC
PH UR STRIP: 6 [PH]
PLATELET # BLD AUTO: 194 K/UL (ref 150–450)
PMV BLD AUTO: 10.5 FL (ref 9.2–12.9)
POTASSIUM SERPL-SCNC: 4.4 MMOL/L (ref 3.5–5.1)
PROT SERPL-MCNC: 7.5 GM/DL (ref 6–8.4)
PROT UR QL STRIP: NEGATIVE
RBC # BLD AUTO: 4.47 M/UL (ref 4–5.4)
RBC #/AREA URNS AUTO: 2 /HPF (ref 0–4)
RELATIVE EOSINOPHIL (OHS): 0.5 %
RELATIVE LYMPHOCYTE (OHS): 15.6 % (ref 18–48)
RELATIVE MONOCYTE (OHS): 10.2 % (ref 4–15)
RELATIVE NEUTROPHIL (OHS): 73.3 % (ref 38–73)
SODIUM SERPL-SCNC: 134 MMOL/L (ref 136–145)
SP GR UR STRIP: 1.01
SQUAMOUS #/AREA URNS AUTO: <1 /HPF
TSH SERPL-ACNC: 1.03 UIU/ML (ref 0.4–4)
UROBILINOGEN UR STRIP-ACNC: NEGATIVE EU/DL
WBC # BLD AUTO: 8.25 K/UL (ref 3.9–12.7)
WBC #/AREA URNS AUTO: 1 /HPF (ref 0–5)

## 2025-05-23 PROCEDURE — G0378 HOSPITAL OBSERVATION PER HR: HCPCS

## 2025-05-23 PROCEDURE — 81001 URINALYSIS AUTO W/SCOPE: CPT | Performed by: EMERGENCY MEDICINE

## 2025-05-23 PROCEDURE — 83605 ASSAY OF LACTIC ACID: CPT | Performed by: PHYSICIAN ASSISTANT

## 2025-05-23 PROCEDURE — 25000003 PHARM REV CODE 250: Performed by: PHYSICIAN ASSISTANT

## 2025-05-23 PROCEDURE — 86803 HEPATITIS C AB TEST: CPT | Performed by: PHYSICIAN ASSISTANT

## 2025-05-23 PROCEDURE — 80053 COMPREHEN METABOLIC PANEL: CPT | Performed by: EMERGENCY MEDICINE

## 2025-05-23 PROCEDURE — 25000003 PHARM REV CODE 250: Performed by: NURSE PRACTITIONER

## 2025-05-23 PROCEDURE — 85025 COMPLETE CBC W/AUTO DIFF WBC: CPT | Performed by: EMERGENCY MEDICINE

## 2025-05-23 PROCEDURE — 63600175 PHARM REV CODE 636 W HCPCS: Performed by: PHYSICIAN ASSISTANT

## 2025-05-23 PROCEDURE — 87040 BLOOD CULTURE FOR BACTERIA: CPT | Performed by: PHYSICIAN ASSISTANT

## 2025-05-23 PROCEDURE — 84443 ASSAY THYROID STIM HORMONE: CPT | Performed by: PHYSICIAN ASSISTANT

## 2025-05-23 PROCEDURE — 87389 HIV-1 AG W/HIV-1&-2 AB AG IA: CPT | Performed by: PHYSICIAN ASSISTANT

## 2025-05-23 PROCEDURE — 83036 HEMOGLOBIN GLYCOSYLATED A1C: CPT | Performed by: NURSE PRACTITIONER

## 2025-05-23 RX ORDER — ACETAMINOPHEN 325 MG/1
650 TABLET ORAL EVERY 4 HOURS PRN
Status: DISCONTINUED | OUTPATIENT
Start: 2025-05-24 | End: 2025-05-24 | Stop reason: HOSPADM

## 2025-05-23 RX ORDER — CHOLECALCIFEROL (VITAMIN D3) 25 MCG
1000 TABLET ORAL DAILY
Status: DISCONTINUED | OUTPATIENT
Start: 2025-05-24 | End: 2025-05-24 | Stop reason: HOSPADM

## 2025-05-23 RX ORDER — ONDANSETRON HYDROCHLORIDE 2 MG/ML
4 INJECTION, SOLUTION INTRAVENOUS EVERY 8 HOURS PRN
Status: DISCONTINUED | OUTPATIENT
Start: 2025-05-24 | End: 2025-05-24 | Stop reason: HOSPADM

## 2025-05-23 RX ORDER — SODIUM CHLORIDE 0.9 % (FLUSH) 0.9 %
10 SYRINGE (ML) INJECTION EVERY 12 HOURS PRN
Status: DISCONTINUED | OUTPATIENT
Start: 2025-05-24 | End: 2025-05-24 | Stop reason: HOSPADM

## 2025-05-23 RX ORDER — OXYCODONE AND ACETAMINOPHEN 10; 325 MG/1; MG/1
1 TABLET ORAL
Refills: 0 | Status: COMPLETED | OUTPATIENT
Start: 2025-05-23 | End: 2025-05-23

## 2025-05-23 RX ORDER — METFORMIN HYDROCHLORIDE 1000 MG/1
1000 TABLET ORAL 2 TIMES DAILY
COMMUNITY

## 2025-05-23 RX ORDER — OXYCODONE HYDROCHLORIDE 5 MG/1
5 TABLET ORAL EVERY 4 HOURS PRN
Refills: 0 | Status: DISCONTINUED | OUTPATIENT
Start: 2025-05-24 | End: 2025-05-24 | Stop reason: HOSPADM

## 2025-05-23 RX ORDER — IBUPROFEN 200 MG
24 TABLET ORAL
Status: DISCONTINUED | OUTPATIENT
Start: 2025-05-24 | End: 2025-05-24 | Stop reason: HOSPADM

## 2025-05-23 RX ORDER — GLUCAGON 1 MG
1 KIT INJECTION
Status: DISCONTINUED | OUTPATIENT
Start: 2025-05-24 | End: 2025-05-24 | Stop reason: HOSPADM

## 2025-05-23 RX ORDER — SODIUM CHLORIDE, SODIUM LACTATE, POTASSIUM CHLORIDE, CALCIUM CHLORIDE 600; 310; 30; 20 MG/100ML; MG/100ML; MG/100ML; MG/100ML
INJECTION, SOLUTION INTRAVENOUS CONTINUOUS
Status: ACTIVE | OUTPATIENT
Start: 2025-05-24 | End: 2025-05-24

## 2025-05-23 RX ORDER — TALC
6 POWDER (GRAM) TOPICAL NIGHTLY PRN
Status: DISCONTINUED | OUTPATIENT
Start: 2025-05-24 | End: 2025-05-24 | Stop reason: HOSPADM

## 2025-05-23 RX ORDER — PIOGLITAZONE 15 MG/1
15 TABLET ORAL
COMMUNITY
Start: 2025-05-09

## 2025-05-23 RX ORDER — KETOROLAC TROMETHAMINE 30 MG/ML
10 INJECTION, SOLUTION INTRAMUSCULAR; INTRAVENOUS
Status: DISCONTINUED | OUTPATIENT
Start: 2025-05-23 | End: 2025-05-23

## 2025-05-23 RX ORDER — CALCITONIN SALMON 200 [IU]/.09ML
SPRAY, METERED NASAL
COMMUNITY
Start: 2025-03-25

## 2025-05-23 RX ORDER — PANTOPRAZOLE SODIUM 40 MG/1
40 TABLET, DELAYED RELEASE ORAL EVERY MORNING
Status: DISCONTINUED | OUTPATIENT
Start: 2025-05-24 | End: 2025-05-24 | Stop reason: HOSPADM

## 2025-05-23 RX ORDER — NALOXONE HCL 0.4 MG/ML
0.02 VIAL (ML) INJECTION
Status: DISCONTINUED | OUTPATIENT
Start: 2025-05-24 | End: 2025-05-24 | Stop reason: HOSPADM

## 2025-05-23 RX ORDER — PANTOPRAZOLE SODIUM 40 MG/1
40 TABLET, DELAYED RELEASE ORAL EVERY MORNING
COMMUNITY

## 2025-05-23 RX ORDER — ONDANSETRON HYDROCHLORIDE 2 MG/ML
4 INJECTION, SOLUTION INTRAVENOUS
Status: COMPLETED | OUTPATIENT
Start: 2025-05-23 | End: 2025-05-23

## 2025-05-23 RX ORDER — IBUPROFEN 200 MG
16 TABLET ORAL
Status: DISCONTINUED | OUTPATIENT
Start: 2025-05-24 | End: 2025-05-24 | Stop reason: HOSPADM

## 2025-05-23 RX ORDER — TAMSULOSIN HYDROCHLORIDE 0.4 MG/1
0.4 CAPSULE ORAL NIGHTLY
Status: DISCONTINUED | OUTPATIENT
Start: 2025-05-23 | End: 2025-05-24 | Stop reason: HOSPADM

## 2025-05-23 RX ORDER — INSULIN ASPART 100 [IU]/ML
0-5 INJECTION, SOLUTION INTRAVENOUS; SUBCUTANEOUS
Status: DISCONTINUED | OUTPATIENT
Start: 2025-05-24 | End: 2025-05-24 | Stop reason: HOSPADM

## 2025-05-23 RX ORDER — MORPHINE SULFATE 2 MG/ML
2 INJECTION, SOLUTION INTRAMUSCULAR; INTRAVENOUS EVERY 4 HOURS PRN
Status: DISCONTINUED | OUTPATIENT
Start: 2025-05-24 | End: 2025-05-24 | Stop reason: HOSPADM

## 2025-05-23 RX ORDER — TRETINOIN 0.5 MG/G
CREAM TOPICAL NIGHTLY
COMMUNITY

## 2025-05-23 RX ORDER — OMEGA-3-ACID ETHYL ESTERS 1 G/1
1 CAPSULE, LIQUID FILLED ORAL DAILY
Status: DISCONTINUED | OUTPATIENT
Start: 2025-05-24 | End: 2025-05-24 | Stop reason: HOSPADM

## 2025-05-23 RX ORDER — IBUPROFEN 600 MG/1
600 TABLET, FILM COATED ORAL EVERY 6 HOURS PRN
COMMUNITY
Start: 2025-05-23 | End: 2025-05-28

## 2025-05-23 RX ADMIN — ONDANSETRON 4 MG: 2 INJECTION INTRAMUSCULAR; INTRAVENOUS at 08:05

## 2025-05-23 RX ADMIN — TAMSULOSIN HYDROCHLORIDE 0.4 MG: 0.4 CAPSULE ORAL at 11:05

## 2025-05-23 RX ADMIN — SODIUM CHLORIDE, POTASSIUM CHLORIDE, SODIUM LACTATE AND CALCIUM CHLORIDE 1000 ML: 600; 310; 30; 20 INJECTION, SOLUTION INTRAVENOUS at 08:05

## 2025-05-23 RX ADMIN — OXYCODONE AND ACETAMINOPHEN 1 TABLET: 325; 10 TABLET ORAL at 08:05

## 2025-05-23 NOTE — FIRST PROVIDER EVALUATION
"  Emergency Department First Provider Evaluation    Krishna Mariee   70 y.o. female   [id number]      5/23/2025        Medical screening examination initiated prior to patient room assignment.        History of present illness:  Right flank pain since Wednesday.  Evaluated in outside ED last night and diagnosed with kidney stone.  Returns with persistent pain, nausea, and vomiting.  Associated dysuria without gross hematuria.  Prescribed ibuprofen, Percocet, Flomax, and Zofran ODT but has not gotten the prescriptions filled.    Vitals:    05/23/25 1822   BP: (!) 151/75   Pulse: 83   Resp: 18   Temp: 98.2 °F (36.8 °C)   TempSrc: Oral   SpO2: 96%   Weight: 44.5 kg (98 lb)   Height: 5' 5" (1.651 m)       Pertinent physical examination findings:  Looks uncomfortable but no distress.    Brief workup plan:  Ordered labs.  Await treating provider for further evaluation management.      This brief and focused assessment was performed to initiate workup and treatment. Follow-up of these results will be performed by the assigned treatment team. Additional data collection, labs, imaging studies, and treatments may be needed for completion of this patient encounter.  "

## 2025-05-23 NOTE — ED PROVIDER NOTES
Encounter Date: 2025       History     Chief Complaint   Patient presents with    Flank Pain     Right flank pain since Wednesday. States she went to  yesterday and was told she has kidney stones     The history is provided by the patient and medical records. No  was used.       Krishna Mariee is a 70 y.o. female with medical history of thyroid nodule presenting to the ED with the chief complaint of R flank pain.     Patient reports 2 days of intermittent R flank pain. She saw her family doctor and was diagnosed with a UTI and given Cipro. She has taken 3 doses of the Cipro. She went to  ED last night due to persistent R flank pain along with N/V. She was diagnosed with a kidney stone and advised to follow-up with Urology on Monday. She has had persistent R flank pain and family concerned she is going to get worse which prompted her repeat visit. She has felt like she had a fever but has not taken her temperature. Family is concerned her thyroid could be causing her calcium levels to be elevated.     ED yesterday:  CT showed 8 mm obstructing calculus at the proximal right ureter resulting in moderate hydroureter and hydronephrosis.   UA non-infectious  Crt elevated 1.3, baseline 0.64    Review of patient's allergies indicates:   Allergen Reactions    Aspirin Nausea Only     Past Medical History:   Diagnosis Date    Cataract     Leiomyoma of uterus     Menopause     Thyroid nodule     Diet Controlled      Past Surgical History:   Procedure Laterality Date    CATARACT EXTRACTION W/  INTRAOCULAR LENS IMPLANT Left 2018    Dr. Wilde     SECTION      x 3    INTRAOCULAR PROSTHESES INSERTION Left 2018    Procedure: INSERTION, IOL PROSTHESIS;  Surgeon: Dwight Wilde MD;  Location: [facility] OR;  Service: Ophthalmology;  Laterality: Left;    PHACOEMULSIFICATION OF CATARACT Left 2018    Procedure: PHACOEMULSIFICATION, CATARACT;  Surgeon: Dwight Wilde MD;  Location: [facility]  OR;  Service: Ophthalmology;  Laterality: Left;     Family History   Problem Relation Name Age of Onset    No Known Problems Son      No Known Problems Daughter      No Known Problems Daughter      Breast cancer Neg Hx      Colon cancer Neg Hx      Ovarian cancer Neg Hx       Social History[1]  Review of Systems   Genitourinary:  Positive for flank pain.       Physical Exam     Initial Vitals [05/23/25 1822]   BP Pulse Resp Temp SpO2   (!) 151/75 83 18 98.2 °F (36.8 °C) 96 %      MAP       --         Physical Exam    Constitutional: She appears well-developed and well-nourished. She is not diaphoretic. No distress.   HENT:   Head: Normocephalic and atraumatic. Mouth/Throat: Oropharynx is clear and moist. No oropharyngeal exudate.   Eyes: Conjunctivae and EOM are normal. Pupils are equal, round, and reactive to light. No scleral icterus.   Neck: Neck supple.   Normal range of motion.  Cardiovascular:  Normal rate and regular rhythm.           Pulmonary/Chest: Breath sounds normal. No respiratory distress. She has no wheezes.   Abdominal: Abdomen is soft. She exhibits no distension. There is no abdominal tenderness.   +R CVA tenderness There is no rebound.   Musculoskeletal:         General: No tenderness or edema. Normal range of motion.      Cervical back: Normal range of motion and neck supple.     Neurological: She is alert and oriented to person, place, and time. She has normal strength. No sensory deficit.   Skin: Skin is warm and dry. No rash noted. No erythema.   Psychiatric: She has a normal mood and affect.         ED Course   Procedures  Labs Reviewed   COMPREHENSIVE METABOLIC PANEL - Abnormal       Result Value    Sodium 134 (*)     Potassium 4.4      Chloride 102      CO2 23      Glucose 137 (*)     BUN 25 (*)     Creatinine 1.4      Calcium 10.4      Protein Total 7.5      Albumin 3.8      Bilirubin Total 0.9      ALP 82      AST 22      ALT 16      Anion Gap 9      eGFR 41 (*)    URINALYSIS, REFLEX TO  URINE CULTURE - Abnormal    Color, UA Yellow      Appearance, UA Clear      pH, UA 6.0      Spec Grav UA 1.010      Protein, UA Negative      Glucose, UA Negative      Ketones, UA Negative      Bilirubin, UA Negative      Blood, UA 2+ (*)     Nitrites, UA Negative      Urobilinogen, UA Negative      Leukocyte Esterase, UA Negative     CBC WITH DIFFERENTIAL - Abnormal    WBC 8.25      RBC 4.47      HGB 12.8      HCT 40.8      MCV 91      MCH 28.6      MCHC 31.4 (*)     RDW 13.1      Platelet Count 194      MPV 10.5      Nucleated RBC 0      Neut % 73.3 (*)     Lymph % 15.6 (*)     Mono % 10.2      Eos % 0.5      Basophil % 0.2      Imm Grans % 0.2      Neut # 6.04      Lymph # 1.29      Mono # 0.84      Eos # 0.04      Baso # 0.02      Imm Grans # 0.02     HEPATITIS C ANTIBODY - Normal    Hep C Ab Interp Non-Reactive     HIV 1 / 2 ANTIBODY - Normal    HIV 1/2 Ag/Ab Non-Reactive     LACTIC ACID, PLASMA - Normal    Lactic Acid Level 1.0      Narrative:     Falsely low lactic acid results can be found in samples containing >=13.0 mg/dL total bilirubin and/or >=3.5 mg/dL direct bilirubin.    TSH - Normal    TSH 1.026     CULTURE, BLOOD   CULTURE, BLOOD   HEP C VIRUS HOLD SPECIMEN    Extra Tube Hold for add-ons.     CBC W/ AUTO DIFFERENTIAL    Narrative:     The following orders were created for panel order CBC auto differential.  Procedure                               Abnormality         Status                     ---------                               -----------         ------                     CBC with Differential[[id number]]       Abnormal            Final result                 Please view results for these tests on the individual orders.   GREY TOP URINE HOLD    Extra Tube Hold for add-ons.     URINALYSIS MICROSCOPIC    RBC, UA 2      WBC, UA 1      Bacteria, UA Rare      Squamous Epithelial Cells, UA <1      Microscopic Comment              Imaging Results    None          Medications   lactated ringers  infusion (has no administration in time range)   tamsulosin 24 hr capsule 0.4 mg (0.4 mg Oral Given 5/23/25 2319)   ondansetron injection 4 mg (4 mg Intravenous Given 5/23/25 2028)   lactated ringers bolus 1,000 mL (0 mLs Intravenous Stopped 5/23/25 2141)   oxyCODONE-acetaminophen  mg per tablet 1 tablet (1 tablet Oral Given 5/23/25 2035)     Medical Decision Making  70 y.o. female with medical history of thyroid nodule presenting to the ED c/o 2 days of intermittent R flank. She was diagnosed with UTI by her PCP 2 days ago and given RX for Cipro. She was seen at  ED last night and diagnosed with a kidney stone. She has had persistent symptoms which prompted her ED visit.     DDx includes but not limited to nephrolithiasis, infected stone, UTI, pyelo, LADI, electrolyte disturbance. CT performed yesterday  has its read visible through care everywhere. Do not feel repeat imaging needed at this time.     Amount and/or Complexity of Data Reviewed  Labs: ordered. Decision-making details documented in ED Course.    Risk  Prescription drug management.               ED Course as of 05/23/25 2333   Fri May 23, 2025   2043 Creatinine: 1.4  Baseline 0.6 [BA]   2043 WBC: 8.25 [BA]   2043 Hemoglobin: 12.8 [BA]   2043 UA clean today noting she has taking 3 doses of Cipro previously [BA]   2044 Discussed patient with Urology who will come see the patient. [BA]   2318 Urology evaluated at bedside and will plan for stent tomorrow. Recommending admission with HM as primary given comorbidities. Discussed with HM, placed in obs for ongoing management. Patient expresses understanding and agreeable to the plan. I have discussed the care of this patient with my supervising physician.  [BA]      ED Course User Index  [BA] Sushil Bower, ZOEY                           Clinical Impression:  Final diagnoses:  [N20.1] Right ureteral stone (Primary)  [N17.9] LADI (acute kidney injury)          ED Disposition Condition    Observation                        [1]   Social History  Tobacco Use    Smoking status: Never    Smokeless tobacco: Never   Substance Use Topics    Alcohol use: No    Drug use: No        Sushil Bower PA-C  05/23/25 8188

## 2025-05-24 ENCOUNTER — ANESTHESIA EVENT (OUTPATIENT)
Dept: SURGERY | Facility: HOSPITAL | Age: 70
End: 2025-05-24
Payer: MEDICARE

## 2025-05-24 ENCOUNTER — ANESTHESIA (OUTPATIENT)
Dept: SURGERY | Facility: HOSPITAL | Age: 70
End: 2025-05-24
Payer: MEDICARE

## 2025-05-24 VITALS
HEART RATE: 80 BPM | TEMPERATURE: 99 F | WEIGHT: 98 LBS | SYSTOLIC BLOOD PRESSURE: 151 MMHG | DIASTOLIC BLOOD PRESSURE: 71 MMHG | OXYGEN SATURATION: 97 % | BODY MASS INDEX: 16.33 KG/M2 | HEIGHT: 65 IN | RESPIRATION RATE: 17 BRPM

## 2025-05-24 PROBLEM — K59.00 CONSTIPATION: Status: ACTIVE | Noted: 2025-05-24

## 2025-05-24 LAB
ABSOLUTE EOSINOPHIL (OHS): 0.06 K/UL
ABSOLUTE MONOCYTE (OHS): 0.74 K/UL (ref 0.3–1)
ABSOLUTE NEUTROPHIL COUNT (OHS): 4.68 K/UL (ref 1.8–7.7)
ALBUMIN SERPL BCP-MCNC: 3.2 G/DL (ref 3.5–5.2)
ALP SERPL-CCNC: 69 UNIT/L (ref 40–150)
ALT SERPL W/O P-5'-P-CCNC: 14 UNIT/L (ref 10–44)
ANION GAP (OHS): 5 MMOL/L (ref 8–16)
AST SERPL-CCNC: 18 UNIT/L (ref 11–45)
BASOPHILS # BLD AUTO: 0.01 K/UL
BASOPHILS NFR BLD AUTO: 0.1 %
BILIRUB SERPL-MCNC: 0.7 MG/DL (ref 0.1–1)
BUN SERPL-MCNC: 20 MG/DL (ref 8–23)
CALCIUM SERPL-MCNC: 10.4 MG/DL (ref 8.7–10.5)
CHLORIDE SERPL-SCNC: 107 MMOL/L (ref 95–110)
CO2 SERPL-SCNC: 25 MMOL/L (ref 23–29)
CREAT SERPL-MCNC: 1.1 MG/DL (ref 0.5–1.4)
EAG (OHS): 143 MG/DL (ref 68–131)
ERYTHROCYTE [DISTWIDTH] IN BLOOD BY AUTOMATED COUNT: 13.2 % (ref 11.5–14.5)
FLUAV AG UPPER RESP QL IA.RAPID: NEGATIVE
FLUBV AG UPPER RESP QL IA.RAPID: NEGATIVE
GFR SERPLBLD CREATININE-BSD FMLA CKD-EPI: 55 ML/MIN/1.73/M2
GLUCOSE SERPL-MCNC: 89 MG/DL (ref 70–110)
HBA1C MFR BLD: 6.6 % (ref 4–5.6)
HCT VFR BLD AUTO: 37.6 % (ref 37–48.5)
HGB BLD-MCNC: 12.2 GM/DL (ref 12–16)
IMM GRANULOCYTES # BLD AUTO: 0.02 K/UL (ref 0–0.04)
IMM GRANULOCYTES NFR BLD AUTO: 0.3 % (ref 0–0.5)
LYMPHOCYTES # BLD AUTO: 1.46 K/UL (ref 1–4.8)
MAGNESIUM SERPL-MCNC: 2.3 MG/DL (ref 1.6–2.6)
MCH RBC QN AUTO: 29.2 PG (ref 27–31)
MCHC RBC AUTO-ENTMCNC: 32.4 G/DL (ref 32–36)
MCV RBC AUTO: 90 FL (ref 82–98)
NUCLEATED RBC (/100WBC) (OHS): 0 /100 WBC
PLATELET # BLD AUTO: 170 K/UL (ref 150–450)
PMV BLD AUTO: 10.1 FL (ref 9.2–12.9)
POCT GLUCOSE: 119 MG/DL (ref 70–110)
POCT GLUCOSE: 134 MG/DL (ref 70–110)
POTASSIUM SERPL-SCNC: 4.3 MMOL/L (ref 3.5–5.1)
PROT SERPL-MCNC: 6.4 GM/DL (ref 6–8.4)
PTH-INTACT SERPL-MCNC: 81.4 PG/ML (ref 9–77)
RBC # BLD AUTO: 4.18 M/UL (ref 4–5.4)
RELATIVE EOSINOPHIL (OHS): 0.9 %
RELATIVE LYMPHOCYTE (OHS): 20.9 % (ref 18–48)
RELATIVE MONOCYTE (OHS): 10.6 % (ref 4–15)
RELATIVE NEUTROPHIL (OHS): 67.2 % (ref 38–73)
RSV A 5' UTR RNA NPH QL NAA+PROBE: NEGATIVE
SARS-COV-2 RNA RESP QL NAA+PROBE: NEGATIVE
SODIUM SERPL-SCNC: 137 MMOL/L (ref 136–145)
WBC # BLD AUTO: 6.97 K/UL (ref 3.9–12.7)

## 2025-05-24 PROCEDURE — 37000009 HC ANESTHESIA EA ADD 15 MINS: Performed by: STUDENT IN AN ORGANIZED HEALTH CARE EDUCATION/TRAINING PROGRAM

## 2025-05-24 PROCEDURE — 0241U SARS-COV2 (COVID) WITH FLU/RSV BY PCR: CPT | Performed by: NURSE PRACTITIONER

## 2025-05-24 PROCEDURE — 85025 COMPLETE CBC W/AUTO DIFF WBC: CPT | Performed by: NURSE PRACTITIONER

## 2025-05-24 PROCEDURE — 52332 CYSTOSCOPY AND TREATMENT: CPT | Mod: RT,,, | Performed by: STUDENT IN AN ORGANIZED HEALTH CARE EDUCATION/TRAINING PROGRAM

## 2025-05-24 PROCEDURE — 80053 COMPREHEN METABOLIC PANEL: CPT | Performed by: NURSE PRACTITIONER

## 2025-05-24 PROCEDURE — 82962 GLUCOSE BLOOD TEST: CPT | Performed by: STUDENT IN AN ORGANIZED HEALTH CARE EDUCATION/TRAINING PROGRAM

## 2025-05-24 PROCEDURE — 83735 ASSAY OF MAGNESIUM: CPT | Performed by: NURSE PRACTITIONER

## 2025-05-24 PROCEDURE — 36415 COLL VENOUS BLD VENIPUNCTURE: CPT | Performed by: NURSE PRACTITIONER

## 2025-05-24 PROCEDURE — 83970 ASSAY OF PARATHORMONE: CPT | Performed by: NURSE PRACTITIONER

## 2025-05-24 PROCEDURE — 71000033 HC RECOVERY, INTIAL HOUR: Performed by: STUDENT IN AN ORGANIZED HEALTH CARE EDUCATION/TRAINING PROGRAM

## 2025-05-24 PROCEDURE — 37000008 HC ANESTHESIA 1ST 15 MINUTES: Performed by: STUDENT IN AN ORGANIZED HEALTH CARE EDUCATION/TRAINING PROGRAM

## 2025-05-24 PROCEDURE — D9220A PRA ANESTHESIA: Mod: CRNA,,, | Performed by: NURSE ANESTHETIST, CERTIFIED REGISTERED

## 2025-05-24 PROCEDURE — 74420 UROGRAPHY RTRGR +-KUB: CPT | Mod: 26,,, | Performed by: STUDENT IN AN ORGANIZED HEALTH CARE EDUCATION/TRAINING PROGRAM

## 2025-05-24 PROCEDURE — 36000706: Performed by: STUDENT IN AN ORGANIZED HEALTH CARE EDUCATION/TRAINING PROGRAM

## 2025-05-24 PROCEDURE — 36000707: Performed by: STUDENT IN AN ORGANIZED HEALTH CARE EDUCATION/TRAINING PROGRAM

## 2025-05-24 PROCEDURE — 25000003 PHARM REV CODE 250: Performed by: NURSE ANESTHETIST, CERTIFIED REGISTERED

## 2025-05-24 PROCEDURE — 63600175 PHARM REV CODE 636 W HCPCS: Performed by: NURSE PRACTITIONER

## 2025-05-24 PROCEDURE — G0378 HOSPITAL OBSERVATION PER HR: HCPCS

## 2025-05-24 PROCEDURE — 63600175 PHARM REV CODE 636 W HCPCS: Performed by: NURSE ANESTHETIST, CERTIFIED REGISTERED

## 2025-05-24 PROCEDURE — 25000003 PHARM REV CODE 250: Performed by: NURSE PRACTITIONER

## 2025-05-24 PROCEDURE — D9220A PRA ANESTHESIA: Mod: ANES,,, | Performed by: ANESTHESIOLOGY

## 2025-05-24 PROCEDURE — 94761 N-INVAS EAR/PLS OXIMETRY MLT: CPT

## 2025-05-24 PROCEDURE — 71000015 HC POSTOP RECOV 1ST HR: Performed by: STUDENT IN AN ORGANIZED HEALTH CARE EDUCATION/TRAINING PROGRAM

## 2025-05-24 PROCEDURE — C2617 STENT, NON-COR, TEM W/O DEL: HCPCS | Performed by: STUDENT IN AN ORGANIZED HEALTH CARE EDUCATION/TRAINING PROGRAM

## 2025-05-24 PROCEDURE — C1758 CATHETER, URETERAL: HCPCS | Performed by: STUDENT IN AN ORGANIZED HEALTH CARE EDUCATION/TRAINING PROGRAM

## 2025-05-24 DEVICE — STENT URETERAL UNIV 6FR 26CM: Type: IMPLANTABLE DEVICE | Site: URETER | Status: FUNCTIONAL

## 2025-05-24 RX ORDER — TAMSULOSIN HYDROCHLORIDE 0.4 MG/1
0.4 CAPSULE ORAL NIGHTLY
Qty: 30 CAPSULE | Refills: 0 | Status: SHIPPED | OUTPATIENT
Start: 2025-05-24 | End: 2025-06-23

## 2025-05-24 RX ORDER — DEXAMETHASONE SODIUM PHOSPHATE 4 MG/ML
INJECTION, SOLUTION INTRA-ARTICULAR; INTRALESIONAL; INTRAMUSCULAR; INTRAVENOUS; SOFT TISSUE
Status: DISCONTINUED | OUTPATIENT
Start: 2025-05-24 | End: 2025-05-24

## 2025-05-24 RX ORDER — CEFAZOLIN SODIUM 1 G/3ML
INJECTION, POWDER, FOR SOLUTION INTRAMUSCULAR; INTRAVENOUS
Status: DISCONTINUED | OUTPATIENT
Start: 2025-05-24 | End: 2025-05-24

## 2025-05-24 RX ORDER — FENTANYL CITRATE 50 UG/ML
25 INJECTION, SOLUTION INTRAMUSCULAR; INTRAVENOUS EVERY 5 MIN PRN
Status: DISCONTINUED | OUTPATIENT
Start: 2025-05-24 | End: 2025-05-24 | Stop reason: HOSPADM

## 2025-05-24 RX ORDER — LABETALOL HYDROCHLORIDE 5 MG/ML
INJECTION, SOLUTION INTRAVENOUS
Status: DISCONTINUED | OUTPATIENT
Start: 2025-05-24 | End: 2025-05-24

## 2025-05-24 RX ORDER — POLYETHYLENE GLYCOL 3350 17 G/17G
17 POWDER, FOR SOLUTION ORAL DAILY
Status: DISCONTINUED | OUTPATIENT
Start: 2025-05-24 | End: 2025-05-24 | Stop reason: HOSPADM

## 2025-05-24 RX ORDER — PROCHLORPERAZINE EDISYLATE 5 MG/ML
5 INJECTION INTRAMUSCULAR; INTRAVENOUS EVERY 6 HOURS PRN
Status: DISCONTINUED | OUTPATIENT
Start: 2025-05-24 | End: 2025-05-24 | Stop reason: HOSPADM

## 2025-05-24 RX ORDER — GLUCAGON 1 MG
1 KIT INJECTION
Status: DISCONTINUED | OUTPATIENT
Start: 2025-05-24 | End: 2025-05-24 | Stop reason: HOSPADM

## 2025-05-24 RX ORDER — ROCURONIUM BROMIDE 10 MG/ML
INJECTION, SOLUTION INTRAVENOUS
Status: DISCONTINUED | OUTPATIENT
Start: 2025-05-24 | End: 2025-05-24

## 2025-05-24 RX ORDER — LIDOCAINE HYDROCHLORIDE 20 MG/ML
INJECTION, SOLUTION EPIDURAL; INFILTRATION; INTRACAUDAL; PERINEURAL
Status: DISCONTINUED | OUTPATIENT
Start: 2025-05-24 | End: 2025-05-24

## 2025-05-24 RX ORDER — SUCCINYLCHOLINE CHLORIDE 20 MG/ML
INJECTION INTRAMUSCULAR; INTRAVENOUS
Status: DISCONTINUED | OUTPATIENT
Start: 2025-05-24 | End: 2025-05-24

## 2025-05-24 RX ORDER — AMOXICILLIN 250 MG
1 CAPSULE ORAL 2 TIMES DAILY
Status: DISCONTINUED | OUTPATIENT
Start: 2025-05-24 | End: 2025-05-24 | Stop reason: HOSPADM

## 2025-05-24 RX ORDER — FENTANYL CITRATE 50 UG/ML
INJECTION, SOLUTION INTRAMUSCULAR; INTRAVENOUS
Status: DISCONTINUED | OUTPATIENT
Start: 2025-05-24 | End: 2025-05-24

## 2025-05-24 RX ORDER — ONDANSETRON HYDROCHLORIDE 2 MG/ML
4 INJECTION, SOLUTION INTRAVENOUS DAILY PRN
Status: DISCONTINUED | OUTPATIENT
Start: 2025-05-24 | End: 2025-05-24 | Stop reason: HOSPADM

## 2025-05-24 RX ORDER — OXYBUTYNIN CHLORIDE 5 MG/1
5 TABLET, EXTENDED RELEASE ORAL DAILY
Qty: 14 TABLET | Refills: 0 | Status: SHIPPED | OUTPATIENT
Start: 2025-05-24 | End: 2025-06-07

## 2025-05-24 RX ORDER — ONDANSETRON HYDROCHLORIDE 2 MG/ML
INJECTION, SOLUTION INTRAVENOUS
Status: DISCONTINUED | OUTPATIENT
Start: 2025-05-24 | End: 2025-05-24

## 2025-05-24 RX ORDER — MIDAZOLAM HYDROCHLORIDE 1 MG/ML
INJECTION INTRAMUSCULAR; INTRAVENOUS
Status: DISCONTINUED | OUTPATIENT
Start: 2025-05-24 | End: 2025-05-24

## 2025-05-24 RX ORDER — POLYETHYLENE GLYCOL 3350 17 G/17G
17 POWDER, FOR SOLUTION ORAL DAILY PRN
Status: DISCONTINUED | OUTPATIENT
Start: 2025-05-24 | End: 2025-05-24

## 2025-05-24 RX ORDER — PROPOFOL 10 MG/ML
VIAL (ML) INTRAVENOUS
Status: DISCONTINUED | OUTPATIENT
Start: 2025-05-24 | End: 2025-05-24

## 2025-05-24 RX ADMIN — MIDAZOLAM HYDROCHLORIDE 1 MG: 2 INJECTION, SOLUTION INTRAMUSCULAR; INTRAVENOUS at 08:05

## 2025-05-24 RX ADMIN — PANTOPRAZOLE SODIUM 40 MG: 40 TABLET, DELAYED RELEASE ORAL at 05:05

## 2025-05-24 RX ADMIN — HYPROMELLOSE 2910 1 DROP: 5 SOLUTION/ DROPS OPHTHALMIC at 06:05

## 2025-05-24 RX ADMIN — SODIUM CHLORIDE, POTASSIUM CHLORIDE, SODIUM LACTATE AND CALCIUM CHLORIDE: 600; 310; 30; 20 INJECTION, SOLUTION INTRAVENOUS at 05:05

## 2025-05-24 RX ADMIN — SUCCINYLCHOLINE 100 MG: 20 INJECTION, SOLUTION INTRAMUSCULAR; INTRAVENOUS at 08:05

## 2025-05-24 RX ADMIN — SENNOSIDES AND DOCUSATE SODIUM 1 TABLET: 50; 8.6 TABLET ORAL at 11:05

## 2025-05-24 RX ADMIN — PROPOFOL 70 MG: 10 INJECTION, EMULSION INTRAVENOUS at 08:05

## 2025-05-24 RX ADMIN — CEFAZOLIN 2 G: 330 INJECTION, POWDER, FOR SOLUTION INTRAMUSCULAR; INTRAVENOUS at 08:05

## 2025-05-24 RX ADMIN — SUGAMMADEX 200 MG: 100 INJECTION, SOLUTION INTRAVENOUS at 09:05

## 2025-05-24 RX ADMIN — DEXAMETHASONE SODIUM PHOSPHATE 4 MG: 4 INJECTION, SOLUTION INTRAMUSCULAR; INTRAVENOUS at 08:05

## 2025-05-24 RX ADMIN — SENNOSIDES AND DOCUSATE SODIUM 1 TABLET: 50; 8.6 TABLET ORAL at 05:05

## 2025-05-24 RX ADMIN — LABETALOL HYDROCHLORIDE 5 MG: 5 INJECTION, SOLUTION INTRAVENOUS at 09:05

## 2025-05-24 RX ADMIN — SODIUM CHLORIDE, SODIUM GLUCONATE, SODIUM ACETATE, POTASSIUM CHLORIDE, MAGNESIUM CHLORIDE, SODIUM PHOSPHATE, DIBASIC, AND POTASSIUM PHOSPHATE: .53; .5; .37; .037; .03; .012; .00082 INJECTION, SOLUTION INTRAVENOUS at 08:05

## 2025-05-24 RX ADMIN — FENTANYL CITRATE 50 MCG: 50 INJECTION, SOLUTION INTRAMUSCULAR; INTRAVENOUS at 08:05

## 2025-05-24 RX ADMIN — PROPOFOL 90 MG: 10 INJECTION, EMULSION INTRAVENOUS at 08:05

## 2025-05-24 RX ADMIN — Medication 1000 UNITS: at 11:05

## 2025-05-24 RX ADMIN — ROCURONIUM BROMIDE 30 MG: 10 INJECTION, SOLUTION INTRAVENOUS at 08:05

## 2025-05-24 RX ADMIN — ONDANSETRON 4 MG: 2 INJECTION INTRAMUSCULAR; INTRAVENOUS at 08:05

## 2025-05-24 RX ADMIN — LIDOCAINE HYDROCHLORIDE 60 MG: 20 INJECTION, SOLUTION EPIDURAL; INFILTRATION; INTRACAUDAL; PERINEURAL at 08:05

## 2025-05-24 NOTE — SUBJECTIVE & OBJECTIVE
Interval History: NAEON, s/p stent placement    Review of Systems  Objective:     Vital Signs (Most Recent):  Temp: 98.6 °F (37 °C) (05/24/25 1249)  Pulse: 80 (05/24/25 1249)  Resp: 17 (05/24/25 1249)  BP: (!) 151/71 (05/24/25 1249)  SpO2: 97 % (05/24/25 1249) Vital Signs (24h Range):  Temp:  [97.6 °F (36.4 °C)-98.6 °F (37 °C)] 98.6 °F (37 °C)  Pulse:  [66-84] 80  Resp:  [16-20] 17  SpO2:  [95 %-100 %] 97 %  BP: (133-176)/(63-83) 151/71     Weight: 44.5 kg (98 lb)  Body mass index is 16.31 kg/m².    Intake/Output Summary (Last 24 hours) at 5/24/2025 1307  Last data filed at 5/24/2025 0902  Gross per 24 hour   Intake 1399 ml   Output 700 ml   Net 699 ml         Physical Exam  Vitals and nursing note reviewed.   Constitutional:       General: She is not in acute distress.     Appearance: She is not toxic-appearing or diaphoretic.   HENT:      Head: Normocephalic and atraumatic.      Nose: Nose normal.      Mouth/Throat:      Mouth: Mucous membranes are moist.   Eyes:      Pupils: Pupils are equal, round, and reactive to light.   Cardiovascular:      Rate and Rhythm: Normal rate and regular rhythm.      Pulses: Normal pulses.   Pulmonary:      Effort: Pulmonary effort is normal. No respiratory distress.      Breath sounds: No wheezing, rhonchi or rales.   Abdominal:      General: Bowel sounds are normal. There is no distension.      Palpations: Abdomen is soft.      Tenderness: There is no abdominal tenderness. There is no right CVA tenderness, left CVA tenderness or guarding.   Musculoskeletal:         General: Normal range of motion.      Cervical back: Normal range of motion.      Right lower leg: No edema.      Left lower leg: No edema.   Skin:     General: Skin is warm and dry.      Capillary Refill: Capillary refill takes less than 2 seconds.   Neurological:      General: No focal deficit present.      Mental Status: She is alert and oriented to person, place, and time.      Motor: No weakness.   Psychiatric:          Mood and Affect: Mood normal.         Behavior: Behavior normal.               Significant Labs: All pertinent labs within the past 24 hours have been reviewed.    Significant Imaging: I have reviewed all pertinent imaging results/findings within the past 24 hours.

## 2025-05-24 NOTE — ASSESSMENT & PLAN NOTE
LADI is likely due to pre-renal azotemia due to dehydration. Baseline creatinine is 0.6. Most recent creatinine and eGFR are listed below.  Recent Labs     05/23/25  0107 05/23/25  1850   CREATININE 1.32* 1.4   EGFRNORACEVR 44* 41*      Plan  - LADI is stable  - Avoid nephrotoxins and renally dose meds for GFR listed above  - Monitor urine output, serial BMP, and adjust therapy as needed  - s/p 1L LR, will continue IVF.

## 2025-05-24 NOTE — ASSESSMENT & PLAN NOTE
Patient's FSGs are controlled on current medication regimen.  Last A1c reviewed-   Lab Results   Component Value Date    HGBA1C 6.6 (H) 05/23/2025     Most recent fingerstick glucose reviewed-   Recent Labs   Lab 05/24/25  0744 05/24/25  0932   POCTGLUCOSE 119* 134*     Current correctional scale  Low  Maintain anti-hyperglycemic dose as follows-   Antihyperglycemics (From admission, onward)      Start     Stop Route Frequency Ordered    05/24/25 0046  insulin aspart U-100 pen 0-5 Units         -- SubQ Before meals & nightly PRN 05/23/25 6357          Hold Oral hypoglycemics while patient is in the hospital.  -Accuchecks AC/HS  -Hgb A1c in process.

## 2025-05-24 NOTE — PLAN OF CARE
[facility] - Observation 11H  Discharge Final Note    Primary Care Provider: Demetri Butterfield MD    Expected Discharge Date: 5/24/2025    Patient to be discharged home. The patient does not have any home needs.  Family to provide transportation home.      Final Discharge Note (most recent)       Final Note - 05/24/25 1126          Final Note    Assessment Type Final Discharge Note     Anticipated Discharge Disposition Home or Self Care        Post-Acute Status    Post-Acute Authorization Other     Other Status No Post-Acute Service Needs     Discharge Delays None known at this time                     Important Message from Medicare             Contact Info       Demetri Butterfield MD   Specialty: Family Medicine   Relationship: PCP - General    [facility]  [address]  [facility] [address]   Phone: [phone]       Next Steps: Follow up    [facility] - Urology [facility] [address] Fl   Specialty: Urology    [address]  [facility] [address]   Phone: [phone]       Next Steps: Follow up

## 2025-05-24 NOTE — MEDICAL/APP STUDENT
Krishna Mariee is a 70 y.o. female with a past medical history of LADI, leiomyoma of the uterus, menopause, thyroid nodule, cataracts & cataract surgery, nuclear sclerosis of the left eye, diabetes mellitus II, and GERD.    Mrs. Mariee was seen yesterday at [facility] Emergency Department and diagnosed with an 8 mm right kidney stone. She presented tonight at [facility] ED with complaints of persistent pain, nausea, and vomiting. Associated dysuria without gross hematuria. Patient given 1L fluid bolus, zofran, and percocet while in the emergency department. VSS.    Review of Systems   Constitutional:  Positive for chills.   HENT: Negative.     Eyes: Negative.    Respiratory: Negative.     Cardiovascular: Negative.    Gastrointestinal:  Positive for constipation and heartburn.   Genitourinary:  Positive for dysuria and flank pain.   Skin: Negative.    Neurological: Negative.    Endo/Heme/Allergies: Negative.    Psychiatric/Behavioral: Negative.       Physical Exam  Vitals reviewed.   Constitutional:       Appearance: Normal appearance. She is normal weight.   HENT:      Head: Normocephalic.      Nose: Nose normal.      Mouth/Throat:      Mouth: Mucous membranes are moist.   Eyes:      Pupils: Pupils are equal, round, and reactive to light.   Neck:      Comments: Some swelling noted to left thyroid area.  Cardiovascular:      Rate and Rhythm: Normal rate and regular rhythm.      Pulses: Normal pulses.      Heart sounds: Normal heart sounds.   Abdominal:      General: Bowel sounds are normal.      Tenderness: There is abdominal tenderness.      Comments: Right flank/right lower quadrant pain/tenderness.   Musculoskeletal:         General: Normal range of motion.      Cervical back: Normal range of motion.   Skin:     General: Skin is warm.   Neurological:      General: No focal deficit present.      Mental Status: She is alert and oriented to person, place, and time. Mental status is at baseline.    Psychiatric:         Mood and Affect: Mood normal.         Behavior: Behavior normal.       Past Medical History:   Diagnosis Date    Cataract     Leiomyoma of uterus     Menopause     Thyroid nodule     Diet Controlled      Past Surgical History:   Procedure Laterality Date    CATARACT EXTRACTION W/  INTRAOCULAR LENS IMPLANT Left 2018    Dr. Wilde     SECTION      x 3    INTRAOCULAR PROSTHESES INSERTION Left 2018    Procedure: INSERTION, IOL PROSTHESIS;  Surgeon: Dwight Wilde MD;  Location: [facility];  Service: Ophthalmology;  Laterality: Left;    PHACOEMULSIFICATION OF CATARACT Left 2018    Procedure: PHACOEMULSIFICATION, CATARACT;  Surgeon: Dwight Wilde MD;  Location: [facility] OR;  Service: Ophthalmology;  Laterality: Left;     Vitals:    25 0029   BP: (!) 146/69   Pulse: 74   Resp: 18   Temp: 97.8 °F (36.6 °C)     Current Scheduled Medications:   multivitamin  1 tablet Oral Daily    omega-3 acid ethyl esters  1 g Oral Daily    pantoprazole  40 mg Oral QAM    senna-docusate  1 tablet Oral BID    tamsulosin  0.4 mg Oral QHS    vitamin D  1,000 Units Oral Daily     Current PRN Medications:     acetaminophen, 650 mg, Oral, Q4H PRN    dextrose 50%, 12.5 g, Intravenous, PRN    dextrose 50%, 25 g, Intravenous, PRN    glucagon (human recombinant), 1 mg, Intramuscular, PRN    glucose, 16 g, Oral, PRN    glucose, 24 g, Oral, PRN    insulin aspart U-100, 0-5 Units, Subcutaneous, QID (AC + HS) PRN    melatonin, 6 mg, Oral, Nightly PRN    morphine, 2 mg, Intravenous, Q4H PRN    naloxone, 0.02 mg, Intravenous, PRN    ondansetron, 4 mg, Intravenous, Q8H PRN    oxyCODONE, 5 mg, Oral, Q4H PRN    polyethylene glycol, 17 g, Oral, Daily PRN    sodium chloride 0.9%, 10 mL, Intravenous, Q12H PRN    Latest CBC:  Lab Results   Component Value Date    WBC 8.25 2025    RBC 4.47 2025    HGB 12.8 2025    HCT 40.8 2025    MCV 91 2025    MCH 28.6 2025    MCHC 31.4 (L)  05/23/2025    RDW 13.1 05/23/2025     05/23/2025    MPV 10.5 05/23/2025    LYMPH 15.6 (L) 05/23/2025    LYMPH 1.29 05/23/2025    MONO 10.2 05/23/2025    MONO 0.84 05/23/2025    EOS 0.5 05/23/2025    EOS 0.04 05/23/2025    BASOPHIL 0.2 05/23/2025    BASOPHIL 0.02 05/23/2025     Latest BMP:  Lab Results   Component Value Date     (L) 05/23/2025    K 4.4 05/23/2025     05/23/2025    CO2 23 05/23/2025    BUN 25 (H) 05/23/2025    CREATININE 1.4 05/23/2025    CALCIUM 10.4 05/23/2025    ANIONGAP 9 05/23/2025    EGFRNORACEVR 41 (L) 05/23/2025     Imaging Results              CT Renal Stone Study Abd Pelvis WO (Final result)  Result time 05/24/25 00:27:14      Final result by Mora Lion MD (05/24/25 00:27:14)                   Impression:      1. Moderate right hydroureteronephrosis secondary to a 5 x 8 mm calculus in the proximal right ureter.  Slight perinephric and periureteral inflammatory change which can be seen with obstruction and/or infection.  Correlation with urinalysis advised.  2. Bilateral lower lobe pulmonary nodules measuring up to 5 mm.  For multiple solid nodules all <6 mm, Fleischner Society 2017 guidelines recommend no routine follow up for a low risk patient, or follow up with non-contrast chest CT at 12 months after discovery in a high risk patient.  3. Moderate to large volume of fecal material throughout the colon.  4. Additional findings as above.      Electronically signed by: Mora Lion MD  Date:    05/24/2025  Time:    00:27               Narrative:    EXAMINATION:  CT RENAL STONE STUDY ABD PELVIS WO    CLINICAL HISTORY:  Nephrolithiasis, symptomatic/complicated;    TECHNIQUE:  Low dose axial images, sagittal and coronal reformations were obtained from the lung bases to the pubic symphysis.  Contrast was not administered.    COMPARISON:  None    FINDINGS:  The visualized lung bases demonstrate dependent bibasilar atelectasis.  There are bilateral lower lobe pulmonary  nodules occluding a 4 mm right lower lobe pulmonary nodule and a 5 mm left lower lobe pulmonary nodule.  For multiple solid nodules all <6 mm, Fleischner Society 2017 guidelines recommend no routine follow up for a low risk patient, or follow up with non-contrast chest CT at 12 months after discovery in a high risk patient.  The visualized portions of the heart and pericardium are within normal limits.    Please note evaluation of solid organ parenchyma is limited due to lack of IV contrast.  The liver, spleen, pancreas and adrenal glands the liver, spleen, pancreas and right adrenal gland demonstrate an unremarkable noncontrast CT appearance.  There is nodular left adrenal gland thickening.  No calcified stones in the gallbladder lumen.    There is moderate right hydroureteronephrosis secondary to a 5 x 8 mm calculus in the proximal right ureter.  There is a slight associated right perinephric and periureteral inflammatory change.  There is no left-sided hydroureteronephrosis.  The urinary bladder is distended with smooth margins.  There is an indeterminate hyperdensity/calcified structure in the right adnexal region measuring 8 mm.  The uterus and left adnexal region are within normal limits.    There is a moderate to large volume of fecal material throughout the colon.  Visualized loops of large and small bowel demonstrate no evidence of obstruction or significant inflammatory change at this time.  There is no free intraperitoneal air, portal venous gas or ascites.    The abdominal aorta is nonaneurysmal with atherosclerosis.  Shotty periaortic lymph nodes are present.  No retroperitoneal fluid/hemorrhage.  The visualized osseous structures are intact.                                  Assessment/Plan:    Right Ureteral Stone:  - Urology consulted   - NPO overnight pending ureteral stent placement and cystoscopy with pyelogram, 5/24  - Pain management with PRNs ordered  - Continuous IVF overnight  - CT AP: 8 mm  stone, right ureter  - Flomax initiated  - Strain urine overnight    Acute Kidney Injury:  - Baseline creatinine: 0.64; creatinine in ED: 1.3, recollect BMP with AM labs  - Continuous IVF overnight, 1L bolus in ED  - Monitor urine output overnight    Constipation:  - CT AP: large amount of stool present throughout colon  - Bowel regimen initiated    GERD:  - Continue PPI  - Chronic, no acute changes    Diabetes Mellitus, II without insulin use:  - Controlled with home medication (metformin)  - Low dose SSI, AC/HS accuchecks  - Maintain blood glucose according to SSI  - Patient states she's unable to afford new Farxiga prescription and has not started this medication but continues metformin regimen  - NO oral antidiabetic medication to be administered during hospitalization

## 2025-05-24 NOTE — HOSPITAL COURSE
S/p ureteral stent placed with Urology on 05/24, tolerated well with resolution of abdominal pain. Discharged with Flomax, Ditropan. Pyridium deferred given contraindication with CrCl<50. Advised Tylenol for pain control, and Miralax/Senna-docusate for constipation noted on CT. Discussed with pt and family incidental BL lower lobe pulmonary nodules with no recommended follow up (pt low risk--never smoker, low exposure to second hand smoke, does work as a ), but pt said she would discuss further with her PCP about possible CT chest in 1 year for follow up. Urology to arrange follow up for stent management.

## 2025-05-24 NOTE — SUBJECTIVE & OBJECTIVE
Past Medical History:   Diagnosis Date    Cataract     Leiomyoma of uterus     Menopause     Thyroid nodule     Diet Controlled        Past Surgical History:   Procedure Laterality Date    CATARACT EXTRACTION W/  INTRAOCULAR LENS IMPLANT Left 2018    Dr. Wilde     SECTION      x 3    INTRAOCULAR PROSTHESES INSERTION Left 2018    Procedure: INSERTION, IOL PROSTHESIS;  Surgeon: Dwight Wilde MD;  Location: [facility];  Service: Ophthalmology;  Laterality: Left;    PHACOEMULSIFICATION OF CATARACT Left 2018    Procedure: PHACOEMULSIFICATION, CATARACT;  Surgeon: Dwight Wilde MD;  Location: [facility];  Service: Ophthalmology;  Laterality: Left;       Review of patient's allergies indicates:   Allergen Reactions    Aspirin Nausea Only       Family History       Problem Relation (Age of Onset)    No Known Problems Son, Daughter, Daughter            Tobacco Use    Smoking status: Never    Smokeless tobacco: Never   Substance and Sexual Activity    Alcohol use: No    Drug use: No    Sexual activity: Yes     Partners: Male     Birth control/protection: Post-menopausal     Comment: :                  Objective:     Temp:  [98.1 °F (36.7 °C)-98.2 °F (36.8 °C)] 98.1 °F (36.7 °C)  Pulse:  [75-83] 75  Resp:  [16-20] 16  SpO2:  [96 %-99 %] 99 %  BP: (144-151)/(75-83) 144/83  Weight: 44.5 kg (98 lb)  Body mass index is 16.31 kg/m².    Date 25 0700 - 25 0659   Shift 7719-1102 5686-5718 7140-4062 24 Hour Total   INTAKE   IV Piggyback  999  999   Shift Total(mL/kg)  999(22.5)  999(22.5)   OUTPUT   Shift Total(mL/kg)       Weight (kg)  44.5 44.5 44.5          Drains       None                    Physical Exam  Constitutional:       General: She is not in acute distress.     Appearance: Normal appearance.   HENT:      Head: Normocephalic and atraumatic.      Nose: Nose normal.   Eyes:      Conjunctiva/sclera: Conjunctivae normal.   Cardiovascular:      Rate and Rhythm: Normal rate.    Pulmonary:      Effort: Pulmonary effort is normal. No respiratory distress.   Abdominal:      General: There is no distension.   Musculoskeletal:         General: No deformity.   Skin:     General: Skin is warm and dry.   Neurological:      General: No focal deficit present.      Mental Status: She is alert.   Psychiatric:         Mood and Affect: Mood normal.         Behavior: Behavior normal.          Significant Labs:    BMP:  Recent Labs   Lab 05/23/25  0107 05/23/25  1850    134*   K 4.5 4.4   CL  --  102   CO2 26 23   BUN 28.0* 25*   CREATININE 1.32* 1.4   GLUCOSE 152*  --    CALCIUM 10.7* 10.4       CBC:  Recent Labs   Lab 05/23/25  0057 05/23/25  1850   WBC 0-5 8.25   HGB  --  12.8   HCT  --  40.8   PLT  --  194       All pertinent labs results from the past 24 hours have been reviewed.    Significant Imaging:  All pertinent imaging results/findings from the past 24 hours have been reviewed.

## 2025-05-24 NOTE — OP NOTE
[facility] - Observation [facility]  Urology Department  Operative Note    Date: 05/24/2025    Pre-Op Diagnosis: Right ureteral stone    Post-Op Diagnosis: same    Procedure(s) Performed:    1. Cystoscopy with right ureteral JJ stent placement  2. right retrograde pyelogram    Specimen(s): none    Staff Surgeon: Armani Woody MD    Assistant Surgeon: Willie Salinas MD , Rosa Carrizales MD    Circulator: Marcie Hercules RN     Anesthesia: Monitored Local Anesthesia with Sedation    Indications: Krishna Mariee is a 70 y.o. female with right ureteral stone with multiple ED visits for pain, LADI.    Findings:  stone not visible on . 6x26 stent placed without complication. No pyonephrosis.     Estimated Blood Loss: min    Drains:  6 Filipino x 26 cm right JJ ureteral stent without strings    Procedure in Detail:  After risks, benefits and possible complications of the procedure were explained, the patient elected to undergo the procedure and informed consent was obtained. All questions were answered in the kosta-operative area. The patient was transferred to the cystoscopy suite and placed on the fluoroscopy table in the supine position.  SCDs were applied and working. Time out was performed, kosta-procedural antibiotics were given. Anesthesia was administered.  After adequate anesthesia the patient was placed in dorsal lithotomy position and prepped and draped in the usual sterile fashion.     A rigid cystoscope in a 22 Fr sheath was introduced into the patients bladder per urethra. This passed easily.  The entire urethra was visualized and revealed no strictures or masses.  Cystoscopy was performed which showed the right and left ureteral orifices in the normal anatomic position.      The right UO was identified and a 5 Fr open-ended ureteral catheter was advanced over a motion wire and placement was confirmed using fluoroscopy. A retrograde pyelogram (RPG) was then performed which showed moderate hydronephrosis and  helped delineate the renal pelvic anatomy. The wire was then inserted via the ureteral catheter to the level of the renal pelvis. This was confirmed with fluoroscopy.    We then passed a 6 Fr x 26 cm JJ ureteral stent without strings over the wire to the level of the renal pelvis under direct vision as well as flouroscopy. The guide wire was removed.  A 180 degree coil was observed in the renal pelvis as well as the bladder using fluoroscopy.  A 180 degree coil was also seen using direct visualization in the bladder.     The bladder was drained and the cystoscope removed from the patient. The patient tolerated the procedure well and was transferred to the recovery room in stable condition.      Disposition: The patient will be transferred back to medicine service. She is okay for discharge from a urologic standpoint. Please discharge with flomax, ditropan, pyridium. We will arrange for her to have follow up for definitive stone management.       Willie Salinas MD

## 2025-05-24 NOTE — SUBJECTIVE & OBJECTIVE
Interval History: NAEON. AFVSS. NPO for stent today.       Objective:     Temp:  [97.6 °F (36.4 °C)-98.4 °F (36.9 °C)] 97.6 °F (36.4 °C)  Pulse:  [66-83] 71  Resp:  [16-20] 17  SpO2:  [95 %-100 %] 98 %  BP: (133-176)/(69-83) 176/81     Body mass index is 16.31 kg/m².           Drains       None                    Physical Exam  Vitals reviewed.   Constitutional:       General: She is not in acute distress.     Appearance: She is not diaphoretic.   HENT:      Head: Normocephalic and atraumatic.      Nose: Nose normal.   Eyes:      Conjunctiva/sclera: Conjunctivae normal.   Cardiovascular:      Rate and Rhythm: Normal rate.   Pulmonary:      Effort: Pulmonary effort is normal. No respiratory distress.   Abdominal:      General: There is no distension.   Musculoskeletal:         General: Normal range of motion.      Cervical back: Normal range of motion.   Skin:     General: Skin is dry.   Neurological:      Mental Status: She is alert.   Psychiatric:         Behavior: Behavior normal.         Thought Content: Thought content normal.           Significant Labs:    BMP:  Recent Labs   Lab 05/23/25  0107 05/23/25  1850 05/24/25  0514    134* 137   K 4.5 4.4 4.3   CL  --  102 107   CO2 26 23 25   BUN 28.0* 25* 20   CREATININE 1.32* 1.4 1.1   GLUCOSE 152*  --   --    CALCIUM 10.7* 10.4 10.4       CBC:   Recent Labs   Lab 05/23/25  0057 05/23/25  1850 05/24/25  0516   WBC 0-5 8.25 6.97   HGB  --  12.8 12.2   HCT  --  40.8 37.6   PLT  --  194 170       All pertinent labs results from the past 24 hours have been reviewed.    Significant Imaging:  All pertinent imaging results/findings from the past 24 hours have been reviewed.

## 2025-05-24 NOTE — HPI
Krishna Mariee is a 70 y.o. female with a PMHx of thyroid nodule, DM2, GERD, and osteoporosis who presents to the ED with right flank pain x3 days. She reports constant right flank pain that radiates around into her RLQ. Endorses associated N/V and intermittent dysuria. Denies fever/chills. She saw her family doctor and was diagnosed with a UTI and given Cipro. She has taken 3 doses of the Cipro. She went to [facility] ED last night due to persistent R flank pain along with N/V. She was diagnosed with a kidney stone and advised to follow-up with Urology on Monday. She has had ongoing R flank pain and family concerned she is going to get worse which prompted her repeat visit. She notes mild cough that began 2 weeks ago but has improved. Notes her  is currently sick with COVID. She denies CP, SOB, headache, diarrhea, or hematuria. Notes mild constipation.     In the ED, VSS, afebrile. CBC with no leukocytosis. Na 134. Glucose 137. Cr 1.4 (bl~0.6). TSH WNL. LA 1.0. blood cxs in process. UA non infectious CT abd/pelvis pending. The patient received zofran, norco, and 1L LR. Urology was consulted and recommend IVF, flomax, and NPO for ureteral stent placement tomorrow.

## 2025-05-24 NOTE — ANESTHESIA POSTPROCEDURE EVALUATION
Anesthesia Post Evaluation    Patient: Krishna Mariee    Procedure(s) Performed: Procedure(s) (LRB):  CYSTOSCOPY, WITH RETROGRADE PYELOGRAM AND URETERAL STENT INSERTION (Right)    Final Anesthesia Type: general      Patient location during evaluation: PACU  Patient participation: Yes- Able to Participate  Level of consciousness: awake and alert  Post-procedure vital signs: reviewed and stable  Pain management: adequate  Airway patency: patent    PONV status at discharge: No PONV  Anesthetic complications: no      Cardiovascular status: hemodynamically stable  Respiratory status: spontaneous ventilation  Follow-up not needed.              Vitals Value Taken Time   /74 05/24/25 09:32   Temp 36.5 °C (97.7 °F) 05/24/25 09:29   Pulse 81 05/24/25 09:36   Resp 15 05/24/25 09:36   SpO2 99 % 05/24/25 09:36   Vitals shown include unfiled device data.      No case tracking events are documented in the log.      Pain/Caroline Score: Pain Rating Prior to Med Admin: 5 (5/23/2025  8:35 PM)  Pain Rating Post Med Admin: 5 (5/23/2025 11:28 PM)  Caroline Score: 8 (5/24/2025  9:32 AM)

## 2025-05-24 NOTE — ASSESSMENT & PLAN NOTE
70yoF with 8mm R UPJ stone, LADI, and second ED visit in two days for pain refractory to oral medications     - Obtain STAT CTRSS, unable to review outside imaging   - Will plan for right ureteral stent placement tomorrow    - Please notify urology team if patient clinical status changes/develops fevers   - NPO at midnight   - IVF bolus   - mIVF   - Avoid NSAIDS, toradol, given LADI   - Flomax   - Strain all urine    - Admit to medicine

## 2025-05-24 NOTE — PLAN OF CARE
Problem: Adult Inpatient Plan of Care  Goal: Plan of Care Review  Outcome: Progressing  Goal: Patient-Specific Goal (Individualized)  Outcome: Progressing  Goal: Absence of Hospital-Acquired Illness or Injury  Outcome: Progressing  Goal: Optimal Comfort and Wellbeing  Outcome: Progressing  Goal: Readiness for Transition of Care  Outcome: Progressing     Problem: Diabetes Comorbidity  Goal: Blood Glucose Level Within Targeted Range  Outcome: Progressing     Problem: Acute Kidney Injury/Impairment  Goal: Fluid and Electrolyte Balance  Outcome: Progressing  Goal: Improved Oral Intake  Outcome: Progressing  Goal: Effective Renal Function  Outcome: Progressing     Problem: Fall Injury Risk  Goal: Absence of Fall and Fall-Related Injury  Outcome: Progressing     Problem: Pain Acute  Goal: Optimal Pain Control and Function  Outcome: Progressing

## 2025-05-24 NOTE — SUBJECTIVE & OBJECTIVE
Past Medical History:   Diagnosis Date    Cataract     Leiomyoma of uterus     Menopause     Thyroid nodule     Diet Controlled        Past Surgical History:   Procedure Laterality Date    CATARACT EXTRACTION W/  INTRAOCULAR LENS IMPLANT Left 2018    Dr. Wilde     SECTION      x 3    INTRAOCULAR PROSTHESES INSERTION Left 2018    Procedure: INSERTION, IOL PROSTHESIS;  Surgeon: Dwight Wilde MD;  Location: [facility];  Service: Ophthalmology;  Laterality: Left;    PHACOEMULSIFICATION OF CATARACT Left 2018    Procedure: PHACOEMULSIFICATION, CATARACT;  Surgeon: Dwight Wilde MD;  Location: [facility];  Service: Ophthalmology;  Laterality: Left;       Review of patient's allergies indicates:   Allergen Reactions    Aspirin Nausea Only       No current facility-administered medications on file prior to encounter.     Current Outpatient Medications on File Prior to Encounter   Medication Sig    calcitonin, salmon, (FORTICAL) 200 unit/actuation nasal spray SMARTSIG:Both Nares    ibuprofen (ADVIL,MOTRIN) 600 MG tablet Take 600 mg by mouth every 6 (six) hours as needed.    pioglitazone (ACTOS) 15 MG tablet Take 15 mg by mouth.    DAILY MULTI-VITAMIN ORAL Take by mouth.    ibandronate (BONIVA) 150 mg tablet TAKE 1 TABLET BY MOUTH EVERY 30 DAYS.    metFORMIN (GLUCOPHAGE) 1000 MG tablet Take 1,000 mg by mouth 2 (two) times daily.    omega-3 fatty acids fish oil (OMEGA-3 2100) 1,050-1,200 mg Cap capsule Take 1 capsule by mouth once daily.    pantoprazole (PROTONIX) 40 MG tablet Take 40 mg by mouth every morning.    tretinoin (RETIN-A) 0.05 % cream Apply topically every evening.    UNABLE TO FIND **  VITAMIN FOR THYROID    vitamin D (VITAMIN D3) 1000 units Tab Take 1,000 Units by mouth once daily.     Family History       Problem Relation (Age of Onset)    No Known Problems Son, Daughter, Daughter          Tobacco Use    Smoking status: Never    Smokeless tobacco: Never   Substance and Sexual Activity     Alcohol use: No    Drug use: No    Sexual activity: Yes     Partners: Male     Birth control/protection: Post-menopausal     Comment: :       2005     Review of Systems   Constitutional:  Negative for chills, diaphoresis, fatigue and fever.   HENT:  Negative for congestion, rhinorrhea and sore throat.    Eyes:  Negative for photophobia and visual disturbance.   Respiratory:  Positive for cough. Negative for shortness of breath and wheezing.    Cardiovascular:  Negative for chest pain, palpitations and leg swelling.   Gastrointestinal:  Positive for abdominal pain, constipation, nausea and vomiting. Negative for abdominal distention and diarrhea.   Genitourinary:  Positive for dysuria and flank pain. Negative for hematuria.   Musculoskeletal:  Negative for arthralgias, myalgias and neck pain.   Skin:  Negative for color change, pallor, rash and wound.   Neurological:  Negative for syncope, weakness, light-headedness and headaches.   Psychiatric/Behavioral:  Negative for confusion and hallucinations. The patient is not nervous/anxious.      Objective:     Vital Signs (Most Recent):  Temp: 98.1 °F (36.7 °C) (05/23/25 2219)  Pulse: 75 (05/23/25 2219)  Resp: 16 (05/23/25 2222)  BP: (!) 144/83 (05/23/25 2219)  SpO2: 99 % (05/23/25 2219) Vital Signs (24h Range):  Temp:  [98.1 °F (36.7 °C)-98.2 °F (36.8 °C)] 98.1 °F (36.7 °C)  Pulse:  [75-83] 75  Resp:  [16-20] 16  SpO2:  [96 %-99 %] 99 %  BP: (144-151)/(75-83) 144/83     Weight: 44.5 kg (98 lb)  Body mass index is 16.31 kg/m².     Physical Exam  Vitals and nursing note reviewed.   Constitutional:       General: She is not in acute distress.     Appearance: She is not toxic-appearing or diaphoretic.   HENT:      Head: Normocephalic and atraumatic.      Nose: Nose normal.      Mouth/Throat:      Mouth: Mucous membranes are moist.   Eyes:      Pupils: Pupils are equal, round, and reactive to light.   Cardiovascular:      Rate and Rhythm: Normal rate and regular  rhythm.      Pulses: Normal pulses.   Pulmonary:      Effort: Pulmonary effort is normal. No respiratory distress.      Breath sounds: No wheezing, rhonchi or rales.   Abdominal:      General: Bowel sounds are normal. There is no distension.      Palpations: Abdomen is soft.      Tenderness: There is no abdominal tenderness. There is right CVA tenderness. There is no left CVA tenderness or guarding.   Musculoskeletal:         General: Normal range of motion.      Cervical back: Normal range of motion.      Right lower leg: No edema.      Left lower leg: No edema.   Skin:     General: Skin is warm and dry.      Capillary Refill: Capillary refill takes less than 2 seconds.   Neurological:      General: No focal deficit present.      Mental Status: She is alert and oriented to person, place, and time.      Motor: No weakness.   Psychiatric:         Mood and Affect: Mood normal.         Behavior: Behavior normal.              CRANIAL NERVES     CN III, IV, VI   Pupils are equal, round, and reactive to light.       Significant Labs: All pertinent labs within the past 24 hours have been reviewed.  CBC:   Recent Labs   Lab 05/23/25  0057 05/23/25  1850   WBC 0-5 8.25   HGB  --  12.8   HCT  --  40.8   PLT  --  194     CMP:   Recent Labs   Lab 05/23/25  0107 05/23/25  1850    134*   K 4.5 4.4   CL  --  102   CO2 26 23   GLU  --  137*   BUN 28.0* 25*   CREATININE 1.32* 1.4   CALCIUM 10.7* 10.4   PROT  --  7.5   ALBUMIN 4.9 3.8   BILITOT 0.7 0.9   ALKPHOS  --  82   AST 22 22   ALT 14 16   ANIONGAP 8 9       Significant Imaging: I have reviewed all pertinent imaging results/findings within the past 24 hours.  Imaging Results    None

## 2025-05-24 NOTE — ASSESSMENT & PLAN NOTE
-CT revealed moderate to large volume of fecal material throughout the colon.   -Start bowel regimen.

## 2025-05-24 NOTE — PROGRESS NOTES
"[facility]  Urology  Progress Note    Patient Name: Krishna Mariee  MRN: [id number]  Admission Date: 5/23/2025  Hospital Length of Stay: 0 days  Code Status: Full Code   Attending Provider: Bear Parra MD   Primary Care Physician: Demetri Butterfield MD    Subjective:     HPI:  70yoF consult for right ureteral stone with LADI. Had flank pain since Wednesday, n/v, no fevers/chills. Went to [facility] ED yesterday for flank pain, underwent CT scan that showed 8mm proximal right UPJ stone and associated hydronephrosis. Was discharged due to "lack of urology availability at ." Presented back to the ED today with flank pain and nausea.     On exam she is AF, HDS, WBC wnl, Cr 1.4 (baseline ~0.6), UA non concerning for infection, unable to personally review CT imaging.         Interval History: NAEON. AFVSS. NPO for stent today.       Objective:     Temp:  [97.6 °F (36.4 °C)-98.4 °F (36.9 °C)] 97.6 °F (36.4 °C)  Pulse:  [66-83] 71  Resp:  [16-20] 17  SpO2:  [95 %-100 %] 98 %  BP: (133-176)/(69-83) 176/81     Body mass index is 16.31 kg/m².           Drains       None                    Physical Exam  Vitals reviewed.   Constitutional:       General: She is not in acute distress.     Appearance: She is not diaphoretic.   HENT:      Head: Normocephalic and atraumatic.      Nose: Nose normal.   Eyes:      Conjunctiva/sclera: Conjunctivae normal.   Cardiovascular:      Rate and Rhythm: Normal rate.   Pulmonary:      Effort: Pulmonary effort is normal. No respiratory distress.   Abdominal:      General: There is no distension.   Musculoskeletal:         General: Normal range of motion.      Cervical back: Normal range of motion.   Skin:     General: Skin is dry.   Neurological:      Mental Status: She is alert.   Psychiatric:         Behavior: Behavior normal.         Thought Content: Thought content normal.           Significant Labs:    BMP:  Recent Labs   Lab 05/23/25  0107 05/23/25  1850 05/24/25  0514    134* " 137   K 4.5 4.4 4.3   CL  --  102 107   CO2 26 23 25   BUN 28.0* 25* 20   CREATININE 1.32* 1.4 1.1   GLUCOSE 152*  --   --    CALCIUM 10.7* 10.4 10.4       CBC:   Recent Labs   Lab 05/23/25  0057 05/23/25  1850 05/24/25  0516   WBC 0-5 8.25 6.97   HGB  --  12.8 12.2   HCT  --  40.8 37.6   PLT  --  194 170       All pertinent labs results from the past 24 hours have been reviewed.    Significant Imaging:  All pertinent imaging results/findings from the past 24 hours have been reviewed.                  Assessment/Plan:     * Right ureteral stone  70yoF with 8mm R UPJ stone, LADI, and second ED visit in two days for pain refractory to oral medications     - CT reviewed, agree with findings.    - NPO  - Plan for cystoscopy with ureteral stent placement today.    - IVF   - Cr trending down after fluids.    - Flomax   - Strain all urine   - Appreciate Medicine assistance.         VTE Risk Mitigation (From admission, onward)           Ordered     IP VTE LOW RISK PATIENT  Once         05/23/25 2347     Place sequential compression device  Until discontinued         05/23/25 2347                    Rosa Carrizales MD  Urology  [facility] - Observation 11H

## 2025-05-24 NOTE — PROGRESS NOTES
..Nursing Transfer Note      Reason patient is being transferred: procedure care complete     Transfer To: 745    Transfer via bed    Transfer with n/a    Transported by PCT     Medicines sent: n/a    Any special needs or follow-up needed: routine    Chart send with patient: Yes    Notified: no one on file.     Patient reassessed at: 0932 5/24/25 (date, time)

## 2025-05-24 NOTE — PLAN OF CARE
[facility]  Discharge Assessment    Primary Care Provider: Demetri Butterfield MD     Discharge Assessment (most recent)       BRIEF DISCHARGE ASSESSMENT - 05/24/25 1200          Discharge Planning    Assessment Type Discharge Planning Brief Assessment     Resource/Environmental Concerns none     Equipment Currently Used at Home none     Current Living Arrangements home     Patient/Family Anticipates Transition to home with family     Patient/Family Anticipated Services at Transition none     DME Needed Upon Discharge  none     Discharge Plan A Home with family     Discharge Plan B Home Health;Home with family

## 2025-05-24 NOTE — ASSESSMENT & PLAN NOTE
-Afebrile, no leukocytosis.  -LA 1.0.  -Blood cxs in process.  -UA non infectious.  -CT AP with moderate right hydroureteronephrosis secondary to a 5 x 8 mm calculus in the proximal right ureter. Slight perinephric and periureteral inflammatory change which can be seen with obstruction and/or infection.   -Start flomax.  -Continuous IVF.  -NPO after midnight.  -Strain all urine.  -PRN pain and nausea control.  -Urology consulted in the ED, plan for cystoscopy with pyelogram and ureteral stent placement tomorrow.

## 2025-05-24 NOTE — H&P
[facility] - Emergency Dept  [facility] Medicine  History & Physical    Patient Name: Krishna Mariee  MRN: [id number]  Patient Class: OP- Observation  Admission Date: 5/23/2025  Attending Physician: Bear Parra MD   Primary Care Provider: Demetri Butterfield MD         Patient information was obtained from patient, past medical records, and ER records.     Subjective:     Principal Problem:Right ureteral stone    Chief Complaint:   Chief Complaint   Patient presents with    Flank Pain     Right flank pain since Wednesday. States she went to  yesterday and was told she has kidney stones        HPI: Krishna Mariee is a 70 y.o. female with a PMHx of thyroid nodule, DM2, GERD, and osteoporosis who presents to the ED with right flank pain x3 days. She reports constant right flank pain that radiates around into her RLQ. Endorses associated N/V and intermittent dysuria. Denies fever/chills. She saw her family doctor and was diagnosed with a UTI and given Cipro. She has taken 3 doses of the Cipro. She went to [facility] ED last night due to persistent R flank pain along with N/V. She was diagnosed with a kidney stone and advised to follow-up with Urology on Monday. She has had ongoing R flank pain and family concerned she is going to get worse which prompted her repeat visit. She notes mild cough that began 2 weeks ago but has improved. Notes her  is currently sick with COVID. She denies CP, SOB, headache, diarrhea, or hematuria. Notes mild constipation.     In the ED, VSS, afebrile. CBC with no leukocytosis. Na 134. Glucose 137. Cr 1.4 (bl~0.6). TSH WNL. LA 1.0. blood cxs in process. UA non infectious CT abd/pelvis pending. The patient received zofran, norco, and 1L LR. Urology was consulted and recommend IVF, flomax, and NPO for ureteral stent placement tomorrow.    Past Medical History:   Diagnosis Date    Cataract     Leiomyoma of uterus     Menopause 2005    Thyroid nodule     Diet Controlled        Past Surgical History:    Procedure Laterality Date    CATARACT EXTRACTION W/  INTRAOCULAR LENS IMPLANT Left 2018    Dr. Wilde     SECTION      x 3    INTRAOCULAR PROSTHESES INSERTION Left 2018    Procedure: INSERTION, IOL PROSTHESIS;  Surgeon: Dwight Wilde MD;  Location: [facility] OR;  Service: Ophthalmology;  Laterality: Left;    PHACOEMULSIFICATION OF CATARACT Left 2018    Procedure: PHACOEMULSIFICATION, CATARACT;  Surgeon: Dwight Wilde MD;  Location: [facility] OR;  Service: Ophthalmology;  Laterality: Left;       Review of patient's allergies indicates:   Allergen Reactions    Aspirin Nausea Only       No current facility-administered medications on file prior to encounter.     Current Outpatient Medications on File Prior to Encounter   Medication Sig    calcitonin, salmon, (FORTICAL) 200 unit/actuation nasal spray SMARTSIG:Both Nares    ibuprofen (ADVIL,MOTRIN) 600 MG tablet Take 600 mg by mouth every 6 (six) hours as needed.    pioglitazone (ACTOS) 15 MG tablet Take 15 mg by mouth.    DAILY MULTI-VITAMIN ORAL Take by mouth.    ibandronate (BONIVA) 150 mg tablet TAKE 1 TABLET BY MOUTH EVERY 30 DAYS.    metFORMIN (GLUCOPHAGE) 1000 MG tablet Take 1,000 mg by mouth 2 (two) times daily.    omega-3 fatty acids fish oil (OMEGA-3 2100) 1,050-1,200 mg Cap capsule Take 1 capsule by mouth once daily.    pantoprazole (PROTONIX) 40 MG tablet Take 40 mg by mouth every morning.    tretinoin (RETIN-A) 0.05 % cream Apply topically every evening.    UNABLE TO FIND **  VITAMIN FOR THYROID    vitamin D (VITAMIN D3) 1000 units Tab Take 1,000 Units by mouth once daily.     Family History       Problem Relation (Age of Onset)    No Known Problems Son, Daughter, Daughter          Tobacco Use    Smoking status: Never    Smokeless tobacco: Never   Substance and Sexual Activity    Alcohol use: No    Drug use: No    Sexual activity: Yes     Partners: Male     Birth control/protection: Post-menopausal     Comment: :             Review of Systems   Constitutional:  Negative for chills, diaphoresis, fatigue and fever.   HENT:  Negative for congestion, rhinorrhea and sore throat.    Eyes:  Negative for photophobia and visual disturbance.   Respiratory:  Positive for cough. Negative for shortness of breath and wheezing.    Cardiovascular:  Negative for chest pain, palpitations and leg swelling.   Gastrointestinal:  Positive for abdominal pain, constipation, nausea and vomiting. Negative for abdominal distention and diarrhea.   Genitourinary:  Positive for dysuria and flank pain. Negative for hematuria.   Musculoskeletal:  Negative for arthralgias, myalgias and neck pain.   Skin:  Negative for color change, pallor, rash and wound.   Neurological:  Negative for syncope, weakness, light-headedness and headaches.   Psychiatric/Behavioral:  Negative for confusion and hallucinations. The patient is not nervous/anxious.      Objective:     Vital Signs (Most Recent):  Temp: 98.1 °F (36.7 °C) (05/23/25 2219)  Pulse: 75 (05/23/25 2219)  Resp: 16 (05/23/25 2222)  BP: (!) 144/83 (05/23/25 2219)  SpO2: 99 % (05/23/25 2219) Vital Signs (24h Range):  Temp:  [98.1 °F (36.7 °C)-98.2 °F (36.8 °C)] 98.1 °F (36.7 °C)  Pulse:  [75-83] 75  Resp:  [16-20] 16  SpO2:  [96 %-99 %] 99 %  BP: (144-151)/(75-83) 144/83     Weight: 44.5 kg (98 lb)  Body mass index is 16.31 kg/m².     Physical Exam  Vitals and nursing note reviewed.   Constitutional:       General: She is not in acute distress.     Appearance: She is not toxic-appearing or diaphoretic.   HENT:      Head: Normocephalic and atraumatic.      Nose: Nose normal.      Mouth/Throat:      Mouth: Mucous membranes are moist.   Eyes:      Pupils: Pupils are equal, round, and reactive to light.   Cardiovascular:      Rate and Rhythm: Normal rate and regular rhythm.      Pulses: Normal pulses.   Pulmonary:      Effort: Pulmonary effort is normal. No respiratory distress.      Breath sounds: No wheezing, rhonchi or  rales.   Abdominal:      General: Bowel sounds are normal. There is no distension.      Palpations: Abdomen is soft.      Tenderness: There is no abdominal tenderness. There is right CVA tenderness. There is no left CVA tenderness or guarding.   Musculoskeletal:         General: Normal range of motion.      Cervical back: Normal range of motion.      Right lower leg: No edema.      Left lower leg: No edema.   Skin:     General: Skin is warm and dry.      Capillary Refill: Capillary refill takes less than 2 seconds.   Neurological:      General: No focal deficit present.      Mental Status: She is alert and oriented to person, place, and time.      Motor: No weakness.   Psychiatric:         Mood and Affect: Mood normal.         Behavior: Behavior normal.              CRANIAL NERVES     CN III, IV, VI   Pupils are equal, round, and reactive to light.       Significant Labs: All pertinent labs within the past 24 hours have been reviewed.  CBC:   Recent Labs   Lab 05/23/25  0057 05/23/25  1850   WBC 0-5 8.25   HGB  --  12.8   HCT  --  40.8   PLT  --  194     CMP:   Recent Labs   Lab 05/23/25  0107 05/23/25  1850    134*   K 4.5 4.4   CL  --  102   CO2 26 23   GLU  --  137*   BUN 28.0* 25*   CREATININE 1.32* 1.4   CALCIUM 10.7* 10.4   PROT  --  7.5   ALBUMIN 4.9 3.8   BILITOT 0.7 0.9   ALKPHOS  --  82   AST 22 22   ALT 14 16   ANIONGAP 8 9       Significant Imaging: I have reviewed all pertinent imaging results/findings within the past 24 hours.  Imaging Results    None         Assessment/Plan:     Assessment & Plan  Right ureteral stone  -Afebrile, no leukocytosis.  -LA 1.0.  -Blood cxs in process.  -UA non infectious.  -CT AP with moderate right hydroureteronephrosis secondary to a 5 x 8 mm calculus in the proximal right ureter. Slight perinephric and periureteral inflammatory change which can be seen with obstruction and/or infection.   -Start flomax.  -Continuous IVF.  -NPO after midnight.  -Strain all  "urine.  -PRN pain and nausea control.  -Urology consulted in the ED, plan for cystoscopy with pyelogram and ureteral stent placement tomorrow.  LADI (acute kidney injury)  LADI is likely due to pre-renal azotemia due to dehydration. Baseline creatinine is 0.6. Most recent creatinine and eGFR are listed below.  Recent Labs     05/23/25  0107 05/23/25  1850   CREATININE 1.32* 1.4   EGFRNORACEVR 44* 41*      Plan  - LADI is stable  - Avoid nephrotoxins and renally dose meds for GFR listed above  - Monitor urine output, serial BMP, and adjust therapy as needed  - s/p 1L LR, will continue IVF.  Constipation  -CT revealed moderate to large volume of fecal material throughout the colon.   -Start bowel regimen.  Gastroesophageal reflux disease  -Chronic, stable.  -Continue PPI.  Type 2 diabetes mellitus, without long-term current use of insulin  Patient's FSGs are controlled on current medication regimen.  Last A1c reviewed- No results found for: "LABA1C", "HGBA1C"  Most recent fingerstick glucose reviewed- No results for input(s): "POCTGLUCOSE" in the last 24 hours.  Current correctional scale  Low  Maintain anti-hyperglycemic dose as follows-   Antihyperglycemics (From admission, onward)      Start     Stop Route Frequency Ordered    05/24/25 0046  insulin aspart U-100 pen 0-5 Units         -- SubQ Before meals & nightly PRN 05/23/25 2347          Hold Oral hypoglycemics while patient is in the hospital.  -Accuchecks AC/HS  -Hgb A1c in process.  VTE Risk Mitigation (From admission, onward)           Ordered     IP VTE LOW RISK PATIENT  Once         05/23/25 2347     Place sequential compression device  Until discontinued         05/23/25 2347                         On 05/23/2025, patient should be placed in hospital observation services under my care in collaboration with Mika Landis MD.           Patience Ng NP  Department of Hospital Medicine  [facility] Emergency Dept          "

## 2025-05-24 NOTE — ASSESSMENT & PLAN NOTE
LADI is likely due to pre-renal azotemia due to dehydration. Baseline creatinine is 0.6. Most recent creatinine and eGFR are listed below.  Recent Labs     05/23/25  0107 05/23/25  1850 05/24/25  0514   CREATININE 1.32* 1.4 1.1   EGFRNORACEVR 44* 41* 55*      Plan  - LADI is stable  - Avoid nephrotoxins and renally dose meds for GFR listed above  - Monitor urine output, serial BMP, and adjust therapy as needed  - s/p 1L LR, will continue IVF.

## 2025-05-24 NOTE — DISCHARGE SUMMARY
[facility] Observation [address] [place] [address] Medicine  Discharge Summary      Patient Name: Krishna Mariee  MRN: [id number]  MOILNA: [id number]  Patient Class: OP- Observation  Admission Date: 5/23/2025  Hospital Length of Stay: 0 days  Discharge Date and Time: 05/24/2025 1:09 PM  Attending Physician: Bear Parra MD   Discharging Provider: Bear Parra MD  Primary Care Provider: Demetri Butterfield MD  [facility] Medicine Team: [facility] MED O Bear Parra MD  Primary Care Team: [facility] O    HPI:   Krishna Mariee is a 70 y.o. female with a PMHx of thyroid nodule, DM2, GERD, and osteoporosis who presents to the ED with right flank pain x3 days. She reports constant right flank pain that radiates around into her RLQ. Endorses associated N/V and intermittent dysuria. Denies fever/chills. She saw her family doctor and was diagnosed with a UTI and given Cipro. She has taken 3 doses of the Cipro. She went to [facility] ED last night due to persistent R flank pain along with N/V. She was diagnosed with a kidney stone and advised to follow-up with Urology on Monday. She has had ongoing R flank pain and family concerned she is going to get worse which prompted her repeat visit. She notes mild cough that began 2 weeks ago but has improved. Notes her  is currently sick with COVID. She denies CP, SOB, headache, diarrhea, or hematuria. Notes mild constipation.     In the ED, VSS, afebrile. CBC with no leukocytosis. Na 134. Glucose 137. Cr 1.4 (bl~0.6). TSH WNL. LA 1.0. blood cxs in process. UA non infectious CT abd/pelvis pending. The patient received zofran, norco, and 1L LR. Urology was consulted and recommend IVF, flomax, and NPO for ureteral stent placement tomorrow.    Procedure(s) (LRB):  CYSTOSCOPY, WITH RETROGRADE PYELOGRAM AND URETERAL STENT INSERTION (Right)      Hospital Course:   S/p ureteral stent placed with Urology on 05/24, tolerated well with resolution of abdominal pain. Discharged with Flomax, Ditropan. Pyridium  deferred given contraindication with CrCl<50. Advised Tylenol for pain control, and Miralax/Senna-docusate for constipation noted on CT. Discussed with pt and family incidental BL lower lobe pulmonary nodules with no recommended follow up (pt low risk--never smoker, low exposure to second hand smoke, does work as a ), but pt said she would discuss further with her PCP about possible CT chest in 1 year for follow up. Urology to arrange follow up for stent management.      Goals of Care Treatment Preferences:  Code Status: Full Code      SDOH Screening:  The patient was screened for utility difficulties, food insecurity, transport difficulties, housing insecurity, and interpersonal safety and there were no concerns identified this admission.     Consults:   Consults (From admission, onward)          Status Ordering Provider     Inpatient consult to Urology  Once        Provider:  (Not yet assigned)    PATITO Villalta          Interval History: VERONIKA s/p stent placement    Review of Systems  Objective:     Vital Signs (Most Recent):  Temp: 98.6 °F (37 °C) (05/24/25 1249)  Pulse: 80 (05/24/25 1249)  Resp: 17 (05/24/25 1249)  BP: (!) 151/71 (05/24/25 1249)  SpO2: 97 % (05/24/25 1249) Vital Signs (24h Range):  Temp:  [97.6 °F (36.4 °C)-98.6 °F (37 °C)] 98.6 °F (37 °C)  Pulse:  [66-84] 80  Resp:  [16-20] 17  SpO2:  [95 %-100 %] 97 %  BP: (133-176)/(63-83) 151/71     Weight: 44.5 kg (98 lb)  Body mass index is 16.31 kg/m².    Intake/Output Summary (Last 24 hours) at 5/24/2025 1307  Last data filed at 5/24/2025 0902  Gross per 24 hour   Intake 1399 ml   Output 700 ml   Net 699 ml         Physical Exam  Vitals and nursing note reviewed.   Constitutional:       General: She is not in acute distress.     Appearance: She is not toxic-appearing or diaphoretic.   HENT:      Head: Normocephalic and atraumatic.      Nose: Nose normal.      Mouth/Throat:      Mouth: Mucous membranes are moist.   Eyes:       Pupils: Pupils are equal, round, and reactive to light.   Cardiovascular:      Rate and Rhythm: Normal rate and regular rhythm.      Pulses: Normal pulses.   Pulmonary:      Effort: Pulmonary effort is normal. No respiratory distress.      Breath sounds: No wheezing, rhonchi or rales.   Abdominal:      General: Bowel sounds are normal. There is no distension.      Palpations: Abdomen is soft.      Tenderness: There is no abdominal tenderness. There is no right CVA tenderness, left CVA tenderness or guarding.   Musculoskeletal:         General: Normal range of motion.      Cervical back: Normal range of motion.      Right lower leg: No edema.      Left lower leg: No edema.   Skin:     General: Skin is warm and dry.      Capillary Refill: Capillary refill takes less than 2 seconds.   Neurological:      General: No focal deficit present.      Mental Status: She is alert and oriented to person, place, and time.      Motor: No weakness.   Psychiatric:         Mood and Affect: Mood normal.         Behavior: Behavior normal.               Significant Labs: All pertinent labs within the past 24 hours have been reviewed.    Significant Imaging: I have reviewed all pertinent imaging results/findings within the past 24 hours.  Assessment & Plan  Right ureteral stone  -Afebrile, no leukocytosis.  -LA 1.0.  -Blood cxs in process.  -UA non infectious.  -CT AP with moderate right hydroureteronephrosis secondary to a 5 x 8 mm calculus in the proximal right ureter. Slight perinephric and periureteral inflammatory change which can be seen with obstruction and/or infection.   -Start flomax.  -Continuous IVF.  -NPO after midnight.  -Strain all urine.  -PRN pain and nausea control.  -Urology consulted in the ED, plan for cystoscopy with pyelogram and ureteral stent placement tomorrow.  LADI (acute kidney injury)  LADI is likely due to pre-renal azotemia due to dehydration. Baseline creatinine is 0.6. Most recent creatinine and eGFR are  listed below.  Recent Labs     05/23/25  0107 05/23/25  1850 05/24/25  0514   CREATININE 1.32* 1.4 1.1   EGFRNORACEVR 44* 41* 55*      Plan  - LADI is stable  - Avoid nephrotoxins and renally dose meds for GFR listed above  - Monitor urine output, serial BMP, and adjust therapy as needed  - s/p 1L LR, will continue IVF.  Constipation  -CT revealed moderate to large volume of fecal material throughout the colon.   -Start bowel regimen.  Gastroesophageal reflux disease  -Chronic, stable.  -Continue PPI.  Type 2 diabetes mellitus, without long-term current use of insulin  Patient's FSGs are controlled on current medication regimen.  Last A1c reviewed-   Lab Results   Component Value Date    HGBA1C 6.6 (H) 05/23/2025     Most recent fingerstick glucose reviewed-   Recent Labs   Lab 05/24/25  0744 05/24/25  0932   POCTGLUCOSE 119* 134*     Current correctional scale  Low  Maintain anti-hyperglycemic dose as follows-   Antihyperglycemics (From admission, onward)      Start     Stop Route Frequency Ordered    05/24/25 0046  insulin aspart U-100 pen 0-5 Units         -- SubQ Before meals & nightly PRN 05/23/25 2347          Hold Oral hypoglycemics while patient is in the hospital.  -Accuchecks AC/HS  -Hgb A1c in process.  Final Active Diagnoses:    Diagnosis Date Noted POA    PRINCIPAL PROBLEM:  Right ureteral stone [N20.1] 05/23/2025 Yes    Constipation [K59.00] 05/24/2025 Yes    Type 2 diabetes mellitus, without long-term current use of insulin [E11.9] 05/23/2025 Yes    LADI (acute kidney injury) [N17.9] 05/23/2025 Yes    Gastroesophageal reflux disease [K21.9] 05/24/2017 Yes      Problems Resolved During this Admission:       Discharged Condition: good    Disposition: Home or Self Care    Follow Up:   Follow-up Information       Demetri Butterfield MD Follow up.    Specialty: Family Medicine  Contact information:  [address] [facility]  [address]  [place] [address] [phone]               [facility] - Urology [address]  Follow up.    Specialty: Urology  Contact information:  [address]  [facility] [address] [phone]  Additional information:  Main Building, 4th Floor   Please park in [facility] and take Atrium elevator                         Patient Instructions:   No discharge procedures on file.    Significant Diagnostic Studies: N/A    Pending Diagnostic Studies:       None           Medications:  Reconciled Home Medications:      Medication List        START taking these medications      oxybutynin 5 MG Tr24  Commonly known as: DITROPAN-XL  Take 1 tablet (5 mg total) by mouth once daily. for 14 days     tamsulosin 0.4 mg Cap  Commonly known as: FLOMAX  Take 1 capsule (0.4 mg total) by mouth every evening.            CONTINUE taking these medications      calcitonin (salmon) 200 unit/actuation nasal spray  Commonly known as: FORTICAL  SMARTSIG:Both Nares     DAILY MULTI-VITAMIN ORAL  Take by mouth.     ibandronate 150 mg tablet  Commonly known as: BONIVA  TAKE 1 TABLET BY MOUTH EVERY 30 DAYS.     ibuprofen 600 MG tablet  Commonly known as: ADVIL,MOTRIN  Take 600 mg by mouth every 6 (six) hours as needed.     metFORMIN 1000 MG tablet  Commonly known as: GLUCOPHAGE  Take 1,000 mg by mouth 2 (two) times daily.     OMEGA-3 2100 1,050-1,200 mg Cap capsule  Generic drug: omega-3 fatty acids fish oil  Take 1 capsule by mouth once daily.     pantoprazole 40 MG tablet  Commonly known as: PROTONIX  Take 40 mg by mouth every morning.     pioglitazone 15 MG tablet  Commonly known as: ACTOS  Take 15 mg by mouth.     tretinoin 0.05 % cream  Commonly known as: RETIN-A  Apply topically every evening.     UNABLE TO FIND  **  VITAMIN FOR THYROID     vitamin D 1000 units Tab  Commonly known as: VITAMIN D3  Take 1,000 Units by mouth once daily.              Indwelling Lines/Drains at time of discharge:   Lines/Drains/Airways       None                   Time spent on the discharge of patient: 35 minutes         Bear  MD Fidel  Department of Hospital Medicine  [facility] - Observation 11H

## 2025-05-24 NOTE — CONSULTS
"[facility] - Emergency Dept  Urology  Consult Note    Patient Name: Krishna Mariee  MRN: [id number]  Admission Date: 2025  Hospital Length of Stay: 0   Code Status: No Order   Attending Provider: Kam Sanchez MD   Consulting Provider: Willie Salinas MD  Primary Care Physician: Demetri Butterfield MD  Principal Problem:Right ureteral stone    Inpatient consult to Urology  Consult performed by: Willie Salinas MD  Consult ordered by: Sushil Bower PA-C  Reason for consult: right ureteral stone and ladi          Subjective:     HPI:  70yoF consult for right ureteral stone with LADI. Had flank pain since Wednesday, n/v, no fevers/chills. Went to [facility] ED yesterday for flank pain, underwent CT scan that showed 8mm proximal right UPJ stone and associated hydronephrosis. Was discharged due to "lack of urology availability at ." Presented back to the ED today with flank pain and nausea.     On exam she is AF, HDS, WBC wnl, Cr 1.4 (baseline ~0.6), UA non concerning for infection, unable to personally review CT imaging.         Past Medical History:   Diagnosis Date    Cataract     Leiomyoma of uterus     Menopause     Thyroid nodule     Diet Controlled        Past Surgical History:   Procedure Laterality Date    CATARACT EXTRACTION W/  INTRAOCULAR LENS IMPLANT Left 2018    Dr. Wilde     SECTION      x 3    INTRAOCULAR PROSTHESES INSERTION Left 2018    Procedure: INSERTION, IOL PROSTHESIS;  Surgeon: Dwight Wilde MD;  Location: [facility];  Service: Ophthalmology;  Laterality: Left;    PHACOEMULSIFICATION OF CATARACT Left 2018    Procedure: PHACOEMULSIFICATION, CATARACT;  Surgeon: Dwight Wilde MD;  Location: [facility];  Service: Ophthalmology;  Laterality: Left;       Review of patient's allergies indicates:   Allergen Reactions    Aspirin Nausea Only       Family History       Problem Relation (Age of Onset)    No Known Problems Son, Daughter, Daughter            Tobacco Use    Smoking status: " Never    Smokeless tobacco: Never   Substance and Sexual Activity    Alcohol use: No    Drug use: No    Sexual activity: Yes     Partners: Male     Birth control/protection: Post-menopausal     Comment: :       2005           Objective:     Temp:  [98.1 °F (36.7 °C)-98.2 °F (36.8 °C)] 98.1 °F (36.7 °C)  Pulse:  [75-83] 75  Resp:  [16-20] 16  SpO2:  [96 %-99 %] 99 %  BP: (144-151)/(75-83) 144/83  Weight: 44.5 kg (98 lb)  Body mass index is 16.31 kg/m².    Date 05/23/25 0700 - 05/24/25 0659   Shift 7765-3177 6756-8680 5306-3219 24 Hour Total   INTAKE   IV Piggyback  999  999   Shift Total(mL/kg)  999(22.5)  999(22.5)   OUTPUT   Shift Total(mL/kg)       Weight (kg)  44.5 44.5 44.5          Drains       None                    Physical Exam  Constitutional:       General: She is not in acute distress.     Appearance: Normal appearance.   HENT:      Head: Normocephalic and atraumatic.      Nose: Nose normal.   Eyes:      Conjunctiva/sclera: Conjunctivae normal.   Cardiovascular:      Rate and Rhythm: Normal rate.   Pulmonary:      Effort: Pulmonary effort is normal. No respiratory distress.   Abdominal:      General: There is no distension.   Musculoskeletal:         General: No deformity.   Skin:     General: Skin is warm and dry.   Neurological:      General: No focal deficit present.      Mental Status: She is alert.   Psychiatric:         Mood and Affect: Mood normal.         Behavior: Behavior normal.          Significant Labs:    BMP:  Recent Labs   Lab 05/23/25  0107 05/23/25  1850    134*   K 4.5 4.4   CL  --  102   CO2 26 23   BUN 28.0* 25*   CREATININE 1.32* 1.4   GLUCOSE 152*  --    CALCIUM 10.7* 10.4       CBC:  Recent Labs   Lab 05/23/25  0057 05/23/25  1850   WBC 0-5 8.25   HGB  --  12.8   HCT  --  40.8   PLT  --  194       All pertinent labs results from the past 24 hours have been reviewed.    Significant Imaging:  All pertinent imaging results/findings from the past 24 hours have been  reviewed.                    Assessment and Plan:     * Right ureteral stone  70yoF with 8mm R UPJ stone, LADI, and second ED visit in two days for pain refractory to oral medications     - Obtain STAT CTRSS, unable to review outside imaging   - Will plan for right ureteral stent placement tomorrow    - Please notify urology team if patient clinical status changes/develops fevers   - NPO at midnight   - IVF bolus   - mIVF   - Avoid NSAIDS, toradol, given LADI   - Flomax   - Strain all urine    - Admit to medicine         VTE Risk Mitigation (From admission, onward)      None            Thank you for your consult. I will follow-up with patient. Please contact us if you have any additional questions.    Willie Salinas MD  Urology  [facility] - Emergency Dept

## 2025-05-24 NOTE — HPI
"70yoF consult for right ureteral stone with LADI. Had flank pain since Wednesday, n/v, no fevers/chills. [facility] yesterday for [facility] pain, underwent CT scan that showed 8mm proximal right UPJ stone and associated hydronephrosis. Was discharged due to "lack of urology availability at ." Presented back to the ED today with flank pain and nausea.     On exam she is AF, HDS, WBC wnl, Cr 1.4 (baseline ~0.6), UA non concerning for infection, unable to personally review CT imaging.       "

## 2025-05-24 NOTE — ASSESSMENT & PLAN NOTE
70yoF with 8mm R UPJ stone, LADI, and second ED visit in two days for pain refractory to oral medications     - CT reviewed, agree with findings.    - NPO  - Plan for cystoscopy with ureteral stent placement today.    - IVF   - Cr trending down after fluids.    - Flomax   - Strain all urine   - Appreciate Medicine assistance.

## 2025-05-24 NOTE — TRANSFER OF CARE
"Anesthesia Transfer of Care Note    Patient: Krishna Mariee    Procedure(s) Performed: Procedure(s) (LRB):  CYSTOSCOPY, WITH RETROGRADE PYELOGRAM AND URETERAL STENT INSERTION (Right)    Patient location: PACU    Anesthesia Type: general    Transport from OR: Transported from OR on 6-10 L/min O2 by face mask with adequate spontaneous ventilation    Post pain: adequate analgesia    Post assessment: no apparent anesthetic complications and tolerated procedure well    Post vital signs: stable    Level of consciousness: awake    Nausea/Vomiting: no nausea/vomiting    Complications: none    Transfer of care protocol was followed      Last vitals: Visit Vitals  /63   Pulse 84   Temp 36.5 °C (97.7 °F) (Temporal)   Resp 20   Ht 5' 5" (1.651 m)   Wt 44.5 kg (98 lb)   SpO2 98%   BMI 16.31 kg/m²     "

## 2025-05-24 NOTE — ASSESSMENT & PLAN NOTE
"Patient's FSGs are controlled on current medication regimen.  Last A1c reviewed- No results found for: "LABA1C", "HGBA1C"  Most recent fingerstick glucose reviewed- No results for input(s): "POCTGLUCOSE" in the last 24 hours.  Current correctional scale  Low  Maintain anti-hyperglycemic dose as follows-   Antihyperglycemics (From admission, onward)      Start     Stop Route Frequency Ordered    05/24/25 0046  insulin aspart U-100 pen 0-5 Units         -- SubQ Before meals & nightly PRN 05/23/25 2347          Hold Oral hypoglycemics while patient is in the hospital.  -Accuchecks AC/HS  -Hgb A1c in process.  "

## 2025-05-24 NOTE — BRIEF OP NOTE
[facility] - Observation 11H  Brief Operative Note    SUMMARY     Surgery Date: 5/24/2025     Surgeons and Role:     * Armani Woody MD - Primary     * Rosa Carrizales MD - Resident - Assisting     * Willie Salinas MD - Resident - Assisting        Pre-op Diagnosis:  Right ureteral stone [N20.1]    Post-op Diagnosis:  Post-Op Diagnosis Codes:     * Right ureteral stone [N20.1]    Procedure(s) (LRB):  CYSTOSCOPY, WITH RETROGRADE PYELOGRAM AND URETERAL STENT INSERTION (Right)    Anesthesia: Local MAC    Implants:  Implant Name Type Inv. Item Serial No.  Lot No. LRB No. Used Action   STENT URETERAL UNIV 6FR 26CM - [id number]  STENT URETERAL UNIV 6FR 26CM  [employer]  Right 1 Implanted       Operative Findings: Procedures as stated above successfully performed. No complications. 6x26 right ureteral stent placed. No pyonephrosis.       Estimated Blood Loss: min    Estimated Blood Loss has not been documented. EBL = 0cc.         Specimens:   Specimen (24h ago, onward)      None          * No specimens in log *    [id number]

## 2025-05-24 NOTE — ANESTHESIA PREPROCEDURE EVALUATION
[facility]  Anesthesia Pre-Operative Evaluation         Patient Name: Krishna Mariee  YOB: 1955  MRN: [id number]    SUBJECTIVE:     Pre-operative evaluation for Procedure(s) (LRB):  CYSTOSCOPY, WITH RETROGRADE PYELOGRAM AND URETERAL STENT INSERTION (Right)     05/24/2025    Krishna Mariee is a 70 y.o. female w/ a significant PMHx of DM2, GERD, and osteoporosis who presents to the ED with right flank pain x3 days. Imaging significant for CT AP with moderate right hydroureteronephrosis secondary to a 5 x 8 mm calculus in the proximal right ureter.     Pt remains nauseated, no vomiting.     Patient now presents for the above procedure(s).    No previous echo on file    LDA:        Peripheral IV - Single Lumen 05/23/25 1853 20 G Right Antecubital (Active)   Site Assessment Clean;Dry;Intact 05/24/25 0036   Line Securement Device Secured with sutureless device 05/24/25 0036   Extremity Assessment Distal to IV No abnormal discoloration;No swelling;No warmth 05/24/25 0036   Line Status Saline locked 05/24/25 0036   Dressing Status Clean;Dry;Intact 05/24/25 0036   Dressing Intervention Integrity maintained 05/24/25 0036   Reason Not Rotated Not due 05/24/25 0036   Number of days: 0            Peripheral IV - Single Lumen 05/23/25 1921 22 G Left;Posterior Hand (Active)   Site Assessment Clean;Dry;Intact 05/24/25 0036   Line Securement Device Secured with sutureless device 05/24/25 0036   Extremity Assessment Distal to IV No abnormal discoloration 05/24/25 0036   Line Status Saline locked 05/24/25 0036   Dressing Status Clean;Dry;Intact 05/24/25 0036   Dressing Intervention Integrity maintained 05/24/25 0036   Reason Not Rotated Not due 05/24/25 0036   Number of days: 0       Prev airway: None documented.    Drips   lactated ringers   Intravenous Continuous           Problem List[1]    Review of patient's allergies indicates:   Allergen Reactions    Aspirin Nausea Only       Current Inpatient  Medications:   multivitamin  1 tablet Oral Daily    omega-3 acid ethyl esters  1 g Oral Daily    pantoprazole  40 mg Oral QAM    polyethylene glycol  17 g Oral Daily    senna-docusate  1 tablet Oral BID    tamsulosin  0.4 mg Oral QHS    vitamin D  1,000 Units Oral Daily       Medications Ordered Prior to Encounter[2]    Past Surgical History:   Procedure Laterality Date    CATARACT EXTRACTION W/  INTRAOCULAR LENS IMPLANT Left 2018    Dr. Wilde     SECTION      x 3    INTRAOCULAR PROSTHESES INSERTION Left 2018    Procedure: INSERTION, IOL PROSTHESIS;  Surgeon: Dwight Wilde MD;  Location: [facility];  Service: Ophthalmology;  Laterality: Left;    PHACOEMULSIFICATION OF CATARACT Left 2018    Procedure: PHACOEMULSIFICATION, CATARACT;  Surgeon: Dwight Wilde MD;  Location: [facility];  Service: Ophthalmology;  Laterality: Left;       Social History[3]    OBJECTIVE:     Vital Signs Range (Last 24H):  Temp:  [36.6 °C (97.8 °F)-36.9 °C (98.4 °F)]   Pulse:  [66-83]   Resp:  [16-20]   BP: (133-151)/(69-83)   SpO2:  [95 %-100 %]       Significant Labs:  Lab Results   Component Value Date    WBC 8.25 2025    HGB 12.8 2025    HCT 40.8 2025     2025    ALT 16 2025    AST 22 2025     (L) 2025    K 4.4 2025     2025    CREATININE 1.4 2025    BUN 25 (H) 2025    CO2 23 2025    TSH 1.026 2025    HGBA1C 6.6 (H) 2025       Diagnostic Studies: No relevant studies.    EKG:   No results found for this or any previous visit.    2D ECHO:  TTE:  No results found for this or any previous visit.    ISABEL:  No results found for this or any previous visit.    ASSESSMENT/PLAN:         Pre-op Assessment    I have reviewed the Patient Summary Reports.     I have reviewed the Nursing Notes. I have reviewed the NPO Status.   I have reviewed the Medications.     Review of Systems  Anesthesia Hx:  No problems with previous Anesthesia    History of prior surgery of interest to airway management or planning:          Denies Family Hx of Anesthesia complications.    Denies Personal Hx of Anesthesia complications.                    Social:  Non-Smoker, No Alcohol Use       Hematology/Oncology:                      Denies Current/Recent Cancer                EENT/Dental:  denies chronic allergic rhinitis           Cardiovascular:      Denies Hypertension.    Denies CAD.           no hyperlipidemia                               Pulmonary:    Denies COPD.  Denies Asthma.     Denies Sleep Apnea.                Renal/:   renal calculi               Hepatic/GI:     GERD                Musculoskeletal:  Denies Arthritis.               Neurological:    Denies CVA.    Denies Seizures.                                Endocrine:  Diabetes, type 2           Psych:  Denies Psychiatric History.                  Physical Exam  General: Well nourished, Cooperative, Alert and Oriented    Airway:  Mallampati: II   Mouth Opening: Normal  TM Distance: Normal  Tongue: Normal  Neck ROM: Normal ROM    Dental:  Intact        Anesthesia Plan  Type of Anesthesia, risks & benefits discussed:    Anesthesia Type: Gen Natural Airway, MAC  Intra-op Monitoring Plan: Standard ASA Monitors  Post Op Pain Control Plan: multimodal analgesia and IV/PO Opioids PRN  Induction:  IV  Airway Plan: Direct, Post-Induction  Informed Consent: Informed consent signed with the Patient and all parties understand the risks and agree with anesthesia plan.  All questions answered.   ASA Score: 3  Day of Surgery Review of History & Physical: H&P Update referred to the surgeon/provider.    Ready For Surgery From Anesthesia Perspective.     .           [1]   Patient Active Problem List  Diagnosis    Nuclear sclerosis of left eye    Leiomyoma of uterus    Weight loss    Right ureteral stone    Gastroesophageal reflux disease    Type 2 diabetes mellitus, without long-term current use of insulin    LADI  (acute kidney injury)    Constipation   [2]   No current facility-administered medications on file prior to encounter.     Current Outpatient Medications on File Prior to Encounter   Medication Sig Dispense Refill    calcitonin, salmon, (FORTICAL) 200 unit/actuation nasal spray SMARTSIG:Both Nares      ibuprofen (ADVIL,MOTRIN) 600 MG tablet Take 600 mg by mouth every 6 (six) hours as needed.      pioglitazone (ACTOS) 15 MG tablet Take 15 mg by mouth.      DAILY MULTI-VITAMIN ORAL Take by mouth.      ibandronate (BONIVA) 150 mg tablet TAKE 1 TABLET BY MOUTH EVERY 30 DAYS. 3 tablet 3    metFORMIN (GLUCOPHAGE) 1000 MG tablet Take 1,000 mg by mouth 2 (two) times daily.      omega-3 fatty acids fish oil (OMEGA-3 2100) 1,050-1,200 mg Cap capsule Take 1 capsule by mouth once daily.      pantoprazole (PROTONIX) 40 MG tablet Take 40 mg by mouth every morning.      tretinoin (RETIN-A) 0.05 % cream Apply topically every evening.      UNABLE TO FIND **  VITAMIN FOR THYROID      vitamin D (VITAMIN D3) 1000 units Tab Take 1,000 Units by mouth once daily.     [3]   Social History  Socioeconomic History    Marital status:    Tobacco Use    Smoking status: Never    Smokeless tobacco: Never   Substance and Sexual Activity    Alcohol use: No    Drug use: No    Sexual activity: Yes     Partners: Male     Birth control/protection: Post-menopausal     Comment: :     [facility]  2005     Social Drivers of Health     Financial Resource Strain: Low Risk  (5/24/2025)    Overall Financial Resource Strain (CARDIA)     Difficulty of Paying Living Expenses: Not very hard   Food Insecurity: No Food Insecurity (5/24/2025)    Hunger Vital Sign     Worried About Running Out of Food in the Last Year: Never true     Ran Out of Food in the Last Year: Never true   Transportation Needs: No Transportation Needs (5/24/2025)    PRAPARE - Transportation     Lack of Transportation (Medical): No     Lack of Transportation (Non-Medical): No   Stress:  Stress Concern Present (5/24/2025)    [facility] [place] [facility]ational [facility] - Occupational Stress Questionnaire     Feeling of Stress : To some extent   Housing Stability: Low Risk  (5/24/2025)    Housing Stability Vital Sign     Unable to Pay for Housing in the Last Year: No     Homeless in the Last Year: No

## 2025-05-24 NOTE — ANESTHESIA PROCEDURE NOTES
Intubation    Date/Time: 5/24/2025 8:44 AM    Performed by: Allyson Hall CRNA  Authorized by: Mora Li MD    Intubation:     Induction:  Intravenous    Intubated:  Postinduction    Mask Ventilation:  Not attempted    Attempts:  1    Attempted By:  CRNA    Method of Intubation:  Video laryngoscopy    Blade:  Estevez 3    Laryngeal View Grade: Grade I - full view of cords      Difficult Airway Encountered?: No      Complications:  None    Airway Device:  Oral endotracheal tube    Airway Device Size:  7.0    Style/Cuff Inflation:  Cuffed    Secured at:  The lips    Placement Verified By:  Capnometry    Complicating Factors:  None    Findings Post-Intubation:  BS equal bilateral and atraumatic/condition of teeth unchanged

## 2025-05-24 NOTE — DISCHARGE INSTRUCTIONS
You are starting two medications (Flomax, Ditropan) to promote urine flow and prevent spasms in your urinary system after your stent placement. The Urologists recommended Pyridium as well for bladder spasms, but we did not start this medication as it can cause kidney issues in patients with your kidney function level. The Urologists will call to set up a follow up appointment, but their phone number is also listed in the follow up section of this paperwork.    For constipation you can take Miralax and Senna-docusate over the counter and make sure to stay hydrated.     For pain you can take Tylenol 650mg up to 4-5 times per day as needed.

## 2025-05-24 NOTE — PLAN OF CARE
Problem: Adult Inpatient Plan of Care  Goal: Plan of Care Review  Outcome: Adequate for Care Transition  Goal: Patient-Specific Goal (Individualized)  Outcome: Adequate for Care Transition  Goal: Absence of Hospital-Acquired Illness or Injury  Outcome: Adequate for Care Transition  Goal: Optimal Comfort and Wellbeing  Outcome: Adequate for Care Transition  Goal: Readiness for Transition of Care  Outcome: Adequate for Care Transition     Problem: Diabetes Comorbidity  Goal: Blood Glucose Level Within Targeted Range  Outcome: Adequate for Care Transition     Problem: Acute Kidney Injury/Impairment  Goal: Fluid and Electrolyte Balance  Outcome: Adequate for Care Transition  Goal: Improved Oral Intake  Outcome: Adequate for Care Transition  Goal: Effective Renal Function  Outcome: Adequate for Care Transition     Problem: Fall Injury Risk  Goal: Absence of Fall and Fall-Related Injury  Outcome: Adequate for Care Transition     Problem: Pain Acute  Goal: Optimal Pain Control and Function  Outcome: Adequate for Care Transition

## 2025-05-25 ENCOUNTER — NURSE TRIAGE (OUTPATIENT)
Dept: ADMINISTRATIVE | Facility: CLINIC | Age: 70
End: 2025-05-25
Payer: MEDICARE

## 2025-05-25 NOTE — TELEPHONE ENCOUNTER
Krishna is 1 day post cystoscopy with right ureteral stent placement. Pt requesting confirmation of stone removal and when f/u appt will be. Pt reports some discomfort in RLQ abdomen and right flank, ureteral stents in place and light bleeding noticed with urination only. Pt reports she thinks she was told all symptoms are expected post op. SonRamone states pt would like confirmation of whether or not stone was fully removed and if CT scan can be done to be sure stone was fully removed. Son states he was told office would call to schedule f/u appt. Advised Abdi per triage protocol to call provider within next 24 hours. Home care and call back instructions provided for the mean time. V/u.   Reason for Disposition   [1] Caller has NON-URGENT question AND [2] triager unable to answer question    Additional Information   Negative: Sounds like a life-threatening emergency to the triager   Negative: Chest pain   Negative: Difficulty breathing   Negative: Acting confused (e.g., disoriented, slurred speech) or excessively sleepy   Surgical incision symptoms and questions   Negative: [1] Major abdominal surgical incision AND [2] wound gaping open AND [3] visible internal organs   Negative: Sounds like a life-threatening emergency to the triager   Negative: [1] Bleeding from incision AND [2] won't stop after 10 minutes of direct pressure   Negative: [1] Bleeding (more than a few drops) from incision AND [2] tracheostomy or blood vessel surgery (e.g., carotid endarterectomy, femoral bypass graft, kidney dialysis fistula)   Negative: [1] Widespread rash AND [2] bright red, sunburn-like   Negative: Severe pain in the incision   Negative: [1] Incision gaping open AND [2] < 48 hours since wound re-opened   Negative: [1] Incision gaping open AND [2] length of opening > 2 inches (5 cm)   Negative: Patient sounds very sick or weak to the triager   Negative: Sounds like a serious complication to the triager   Negative: Fever  > 100.4 F (38.0 C)   Negative: [1] Incision looks infected (e.g., spreading redness, pus) AND [2] fever > 99.5 F (37.5 C)   Negative: [1] Incision looks infected (e.g., spreading redness, pus) AND [2] large red area (> 2 in. or 5 cm)   Negative: [1] Incision looks infected (e.g., spreading redness, pus) AND [2] face wound   Negative: [1] Red streak runs from the incision AND [2] longer than 1 inch (2.5 cm)   Negative: [1] Pus or bad-smelling fluid draining from incision AND [2] no fever   Negative: [1] Post-op pain AND [2] not controlled with pain medications   Negative: Dressing soaked with blood or body fluid (e.g., drainage)   Negative: [1] Scant bleeding (e.g., few drops) from incision AND [2] tracheostomy or blood vessel surgery (e.g., carotid endarterectomy, femoral bypass graft, kidney dialysis fistula)   Negative: [1] Raised bruise and [2] size > 2 inches (5 cm) and expanding   Negative: [1] Caller has URGENT question AND [2] triager unable to answer question   Negative: [1] INCREASING pain in incision AND [2] > 2 days (48 hours) since surgery   Negative: [1] Small red area or streak AND [2] no fever   Negative: [1] Clear or blood-tinged fluid draining from wound AND [2] no fever   Negative: [1] Incision gaping open AND [2] > 48 hours since wound re-opened AND [3] length of opening > 1/2 inch (12 mm)   Negative: [1] Incision on face gaping open AND [2] > 48 hours since wound re-opened AND [3] length of opening > 1/4 inch (6 mm)   Negative: Suture or staple removal is overdue   Negative: [1] Suture or staple came out early AND [2] caller wants wound checked    Protocols used: Post-Op Symptoms and [id number], Post-Op Incision Symptoms and [id number]

## 2025-05-25 NOTE — PROGRESS NOTES
Pt discharged home with son. IV's dc'd. Telemetry dc'd. DC instructions reviewed with pt and son. Pt and son verbalized understanding.

## 2025-05-28 LAB
BACTERIA BLD CULT: NORMAL
BACTERIA BLD CULT: NORMAL

## 2025-05-30 ENCOUNTER — TELEPHONE (OUTPATIENT)
Dept: ADMINISTRATIVE | Facility: CLINIC | Age: 70
End: 2025-05-30
Payer: MEDICARE

## 2025-06-02 NOTE — H&P (VIEW-ONLY)
CC: right ureteral stone    Krishna Mariee is a 70 y.o. woman who is here for the evaluation of right ureteral stone.  A new pt referred by her PCP, Demetri Butterfield MD   She was referred to me by Dr. Woody after right ureteral stent placement on 25.    She present to [facility] on 25 with acute right flank pain.  CT Abdomen Pelvis wo IV Contrast   Final Result   1. 8 mm obstructing calculus at the proximal right ureter resulting in moderate hydroureter and hydronephrosis.   2. Mesenteric stranding in the region of the descending colon/cecum (series 2 image 49) may reflect early changes of colitis.   3. Multiple 3 mm posterior left lower lobe solid nodules. In a low-risk patient no additional follow-up is recommended per Fleischner Society recommendations.     Krishna Mariee is a 70 y.o. female with right ureteral stone with multiple ED visits for pain, LADI on 35.  Date: 2025   Pre-Op Diagnosis: Right ureteral stone   Post-Op Diagnosis: same   Procedure(s) Performed:    1. Cystoscopy with right ureteral JJ stent placement  2. right retrograde pyelogram  Findings:  stone not visible on . 6x26 stent placed without complication. No pyonephrosis.   Drains:  6 Israeli x 26 cm right JJ ureteral stent without strings    Following her surgery, she did well.  No more right flank pain.  Occasional blood in urine.    Denies flank pain, dysuria, fever or chills.    She passed small stones in the past.  She is a [place] - [place].  Her son is a chiropractor and was involved in today's visit via phone.        Past Medical History:   Diagnosis Date    Cataract     Leiomyoma of uterus     Menopause     Thyroid nodule     Diet Controlled      Past Surgical History:   Procedure Laterality Date    CATARACT EXTRACTION W/  INTRAOCULAR LENS IMPLANT Left 2018    Dr. Wilde     SECTION      x 3    CYSTOSCOPY WITH URETEROSCOPY, RETROGRADE PYELOGRAPHY, AND INSERTION OF STENT Right 2025     "Procedure: CYSTOSCOPY, WITH RETROGRADE PYELOGRAM AND URETERAL STENT INSERTION;  Surgeon: Armani Woody MD;  Location: [address];  Service: Urology;  Laterality: Right;    INTRAOCULAR PROSTHESES INSERTION Left 9/24/2018    Procedure: INSERTION, IOL PROSTHESIS;  Surgeon: Dwight Wilde MD;  Location: [facility];  Service: Ophthalmology;  Laterality: Left;    PHACOEMULSIFICATION OF CATARACT Left 9/24/2018    Procedure: PHACOEMULSIFICATION, CATARACT;  Surgeon: Dwight Wilde MD;  Location: [facility];  Service: Ophthalmology;  Laterality: Left;     Social History[1]  Family History   Problem Relation Name Age of Onset    No Known Problems Son      No Known Problems Daughter      No Known Problems Daughter      Breast cancer Neg Hx      Colon cancer Neg Hx      Ovarian cancer Neg Hx       Allergy:  Review of patient's allergies indicates:   Allergen Reactions    Aspirin Nausea Only     Encounter Medications[2]  Review of Systems   ROS  Physical Exam     Vitals:    06/03/25 0747   BP: (!) 142/70   Pulse: 60     Physical Exam      LABS:  Lab Results   Component Value Date    CREATININE 1.1 05/24/2025    CREATININE 1.4 05/23/2025    CREATININE 1.32 (H) 05/23/2025     No results found for: "LABURIN"  Hemoglobin A1c   Date Value Ref Range Status   05/23/2025 6.6 (H) 4.0 - 5.6 % Final     Comment:     ADA Screening Guidelines:  5.7-6.4%  Consistent with prediabetes  >=6.5%  Consistent with diabetes    High levels of fetal hemoglobin interfere with the HbA1C  assay. Heterozygous hemoglobin variants (HbS, HgC, etc)do  not significantly interfere with this assay.   However, presence of multiple variants may affect accuracy.     UA today pH 7, 3+ blood, 1 + mohan, negative Nit    Radiology:  CT 5/23/25 at [facility]  1. Moderate right hydroureteronephrosis secondary to a 5 x 8 mm calculus in the proximal right ureter.  Slight perinephric and periureteral inflammatory change which can be seen with obstruction and/or infection.  " Correlation with urinalysis advised.  2. Bilateral lower lobe pulmonary nodules measuring up to 5 mm.  For multiple solid nodules all <6 mm, Fleischner Society 2017 guidelines recommend no routine follow up for a low risk patient, or follow up with non-contrast chest CT at 12 months after discovery in a high risk patient.  3. Moderate to large volume of fecal material throughout the colon.    KUB today 6/3/25  There is a right double-J ureteric stent in place. No definite calculi seen along the course of right ureter. No suspicious calcifications are seen.     Assessment and Plan:  Krishna was seen today for nephrolithiasis.    Diagnoses and all orders for this visit:    Ureteral stone  -     X-Ray Abdomen AP 1 View; Future  -     Urine Culture High Risk    Hematuria, gross  -     Urine Culture High Risk    S/p right ureteral stent placement for obstructive right proximal ureteral stone.  Discussed the options of treatment including ESWL vs ureteroscopic stone surgery.  Pt prefers to undergo less invasive surgery.  Will get a KUB first to determine what treatment will do for her.    Follow-up:  Follow up in about 8 days (around 6/11/2025), or right ureteroscopy with laser lithotripsy, cysto urteral stent replacement.    Addendum:  KUB today showed the right ureteral stent in place.  No obvious calcification along the ureteral stent is noted.  Therefore, I recommend right ureteroscopic laser lithotripsy stone removal, cysto right ureteral stent replacement.  Will change her ureteral stent with its string, so that she can remove it post operatively without undergoing additional procedure.  Pt and pt's son who is a chiropractor involved in today's discuss.  She understood and agreed to proceed with the planned surgery.            [1]   Social History  Tobacco Use    Smoking status: Never    Smokeless tobacco: Never   Substance Use Topics    Alcohol use: No    Drug use: No   [2]   Outpatient Encounter Medications as of  6/3/2025   Medication Sig Dispense Refill    calcitonin, salmon, (FORTICAL) 200 unit/actuation nasal spray SMARTSIG:Both Nares      DAILY MULTI-VITAMIN ORAL Take by mouth.      ibandronate (BONIVA) 150 mg tablet TAKE 1 TABLET BY MOUTH EVERY 30 DAYS. 3 tablet 3    [] ibuprofen (ADVIL,MOTRIN) 600 MG tablet Take 600 mg by mouth every 6 (six) hours as needed.      metFORMIN (GLUCOPHAGE) 1000 MG tablet Take 1,000 mg by mouth 2 (two) times daily.      omega-3 fatty acids fish oil (OMEGA-3 2100) 1,050-1,200 mg Cap capsule Take 1 capsule by mouth once daily.      oxybutynin (DITROPAN-XL) 5 MG TR24 Take 1 tablet (5 mg total) by mouth once daily. for 14 days 14 tablet 0    pantoprazole (PROTONIX) 40 MG tablet Take 40 mg by mouth every morning.      pioglitazone (ACTOS) 15 MG tablet Take 15 mg by mouth.      tamsulosin (FLOMAX) 0.4 mg Cap Take 1 capsule (0.4 mg total) by mouth every evening. 30 capsule 0    tretinoin (RETIN-A) 0.05 % cream Apply topically every evening.      UNABLE TO FIND **  VITAMIN FOR THYROID      vitamin D (VITAMIN D3) 1000 units Tab Take 1,000 Units by mouth once daily.       No facility-administered encounter medications on file as of 6/3/2025.

## 2025-06-03 ENCOUNTER — OFFICE VISIT (OUTPATIENT)
Dept: UROLOGY | Facility: CLINIC | Age: 70
End: 2025-06-03
Payer: MEDICARE

## 2025-06-03 ENCOUNTER — HOSPITAL ENCOUNTER (OUTPATIENT)
Dept: RADIOLOGY | Facility: HOSPITAL | Age: 70
Discharge: HOME | End: 2025-06-03
Attending: UROLOGY
Payer: MEDICARE

## 2025-06-03 VITALS
HEIGHT: 65 IN | SYSTOLIC BLOOD PRESSURE: 142 MMHG | BODY MASS INDEX: 16.66 KG/M2 | HEART RATE: 60 BPM | WEIGHT: 100 LBS | DIASTOLIC BLOOD PRESSURE: 70 MMHG

## 2025-06-03 DIAGNOSIS — N20.1 URETERAL STONE: Primary | ICD-10-CM

## 2025-06-03 DIAGNOSIS — Z96.0 S/P URETERAL STENT PLACEMENT: ICD-10-CM

## 2025-06-03 DIAGNOSIS — N20.1 RIGHT URETERAL STONE: Primary | ICD-10-CM

## 2025-06-03 DIAGNOSIS — N20.1 URETERAL STONE: ICD-10-CM

## 2025-06-03 DIAGNOSIS — R31.0 HEMATURIA, GROSS: ICD-10-CM

## 2025-06-03 PROCEDURE — 3078F DIAST BP <80 MM HG: CPT | Mod: CPTII,S$GLB,, | Performed by: UROLOGY

## 2025-06-03 PROCEDURE — 74018 RADEX ABDOMEN 1 VIEW: CPT | Mod: TC

## 2025-06-03 PROCEDURE — 87086 URINE CULTURE/COLONY COUNT: CPT | Performed by: UROLOGY

## 2025-06-03 PROCEDURE — 3044F HG A1C LEVEL LT 7.0%: CPT | Mod: CPTII,S$GLB,, | Performed by: UROLOGY

## 2025-06-03 PROCEDURE — 3008F BODY MASS INDEX DOCD: CPT | Mod: CPTII,S$GLB,, | Performed by: UROLOGY

## 2025-06-03 PROCEDURE — 74018 RADEX ABDOMEN 1 VIEW: CPT | Mod: 26,,, | Performed by: RADIOLOGY

## 2025-06-03 PROCEDURE — 1159F MED LIST DOCD IN RCRD: CPT | Mod: CPTII,S$GLB,, | Performed by: UROLOGY

## 2025-06-03 PROCEDURE — 99204 OFFICE O/P NEW MOD 45 MIN: CPT | Mod: 57,S$GLB,, | Performed by: UROLOGY

## 2025-06-03 PROCEDURE — 99999 PR PBB SHADOW E&M-EST. PATIENT-LVL IV: CPT | Mod: PBBFAC,,, | Performed by: UROLOGY

## 2025-06-03 PROCEDURE — 3077F SYST BP >= 140 MM HG: CPT | Mod: CPTII,S$GLB,, | Performed by: UROLOGY

## 2025-06-04 LAB — BACTERIA UR CULT: NORMAL

## 2025-06-05 ENCOUNTER — PATIENT MESSAGE (OUTPATIENT)
Dept: UROLOGY | Facility: CLINIC | Age: 70
End: 2025-06-05
Payer: MEDICARE

## 2025-06-09 RX ORDER — DICYCLOMINE HYDROCHLORIDE 20 MG/1
20 TABLET ORAL 2 TIMES DAILY PRN
COMMUNITY

## 2025-06-09 RX ORDER — ONDANSETRON 4 MG/1
4 TABLET, ORALLY DISINTEGRATING ORAL EVERY 8 HOURS PRN
COMMUNITY
Start: 2025-05-23

## 2025-06-09 NOTE — PRE-PROCEDURE INSTRUCTIONS
PreOp Instructions given:   - Verbal medication information (what to hold and what to take)   - NPO guidelines   NO SOLID FOOD, MILK OR MILK PRODUCTS 8 HOURS PRIOR TO SX START TIME.  YOU MAY ONLY HAVE SIPS OF WATER WITH MORNING MEDICATIONS (1) HOUR PRIOR TO ARRIVAL TO HOSPITAL.   - Arrival place directions given; time to be given the day before procedure by the   Surgeon's Office [facility]/MAP  - Bathing with antibacterial soap   - Don't wear any jewelry or bring any valuables AM of surgery   - No makeup or moisturizer to face   - No perfume/cologne, powder, lotions or aftershave   Pt. verbalized understanding.   Pt denies any h/o Anesthesia/Sedation complications or side effects.  Patient does not know arrival time.  Explained that this information comes from the surgeon's office and if they haven't heard from them by 2 or 3 pm to call the office.  Patient stated an understanding.

## 2025-06-10 ENCOUNTER — TELEPHONE (OUTPATIENT)
Dept: UROLOGY | Facility: CLINIC | Age: 70
End: 2025-06-10
Payer: MEDICARE

## 2025-06-11 ENCOUNTER — HOSPITAL ENCOUNTER (OUTPATIENT)
Facility: HOSPITAL | Age: 70
Discharge: HOME | End: 2025-06-11
Attending: UROLOGY | Admitting: UROLOGY
Payer: MEDICARE

## 2025-06-11 ENCOUNTER — ANESTHESIA (OUTPATIENT)
Dept: SURGERY | Facility: HOSPITAL | Age: 70
End: 2025-06-11
Payer: MEDICARE

## 2025-06-11 ENCOUNTER — ANESTHESIA EVENT (OUTPATIENT)
Dept: SURGERY | Facility: HOSPITAL | Age: 70
End: 2025-06-11
Payer: MEDICARE

## 2025-06-11 DIAGNOSIS — Z96.0 S/P URETERAL STENT PLACEMENT: ICD-10-CM

## 2025-06-11 DIAGNOSIS — N20.9 UROLITHIASIS: ICD-10-CM

## 2025-06-11 DIAGNOSIS — N20.1 RIGHT URETERAL STONE: Primary | ICD-10-CM

## 2025-06-11 LAB
POCT GLUCOSE: 116 MG/DL (ref 70–110)
POCT GLUCOSE: 87 MG/DL (ref 70–110)

## 2025-06-11 PROCEDURE — 36000706: Performed by: UROLOGY

## 2025-06-11 PROCEDURE — 63600175 PHARM REV CODE 636 W HCPCS

## 2025-06-11 PROCEDURE — 25000003 PHARM REV CODE 250

## 2025-06-11 PROCEDURE — 27201423 OPTIME MED/SURG SUP & DEVICES STERILE SUPPLY: Performed by: UROLOGY

## 2025-06-11 PROCEDURE — 82962 GLUCOSE BLOOD TEST: CPT | Performed by: UROLOGY

## 2025-06-11 PROCEDURE — 37000009 HC ANESTHESIA EA ADD 15 MINS: Performed by: UROLOGY

## 2025-06-11 PROCEDURE — 82365 CALCULUS SPECTROSCOPY: CPT | Performed by: UROLOGY

## 2025-06-11 PROCEDURE — C1769 GUIDE WIRE: HCPCS | Performed by: UROLOGY

## 2025-06-11 PROCEDURE — 71000015 HC POSTOP RECOV 1ST HR: Performed by: UROLOGY

## 2025-06-11 PROCEDURE — 71000044 HC DOSC ROUTINE RECOVERY FIRST HOUR: Performed by: UROLOGY

## 2025-06-11 PROCEDURE — 52356 CYSTO/URETERO W/LITHOTRIPSY: CPT | Mod: RT,,, | Performed by: UROLOGY

## 2025-06-11 PROCEDURE — 36000707: Performed by: UROLOGY

## 2025-06-11 PROCEDURE — 37000008 HC ANESTHESIA 1ST 15 MINUTES: Performed by: UROLOGY

## 2025-06-11 PROCEDURE — C2617 STENT, NON-COR, TEM W/O DEL: HCPCS | Performed by: UROLOGY

## 2025-06-11 DEVICE — STENT URETERAL UNIV 6FR 26CM: Type: IMPLANTABLE DEVICE | Site: URETER | Status: FUNCTIONAL

## 2025-06-11 RX ORDER — ONDANSETRON HYDROCHLORIDE 2 MG/ML
4 INJECTION, SOLUTION INTRAVENOUS ONCE AS NEEDED
Status: DISCONTINUED | OUTPATIENT
Start: 2025-06-11 | End: 2025-06-11 | Stop reason: HOSPADM

## 2025-06-11 RX ORDER — MIDAZOLAM HYDROCHLORIDE 1 MG/ML
INJECTION, SOLUTION INTRAMUSCULAR; INTRAVENOUS
Status: DISCONTINUED | OUTPATIENT
Start: 2025-06-11 | End: 2025-06-11

## 2025-06-11 RX ORDER — HYDROMORPHONE HYDROCHLORIDE 1 MG/ML
0.2 INJECTION, SOLUTION INTRAMUSCULAR; INTRAVENOUS; SUBCUTANEOUS EVERY 5 MIN PRN
Status: DISCONTINUED | OUTPATIENT
Start: 2025-06-11 | End: 2025-06-11 | Stop reason: HOSPADM

## 2025-06-11 RX ORDER — ONDANSETRON HYDROCHLORIDE 2 MG/ML
4 INJECTION, SOLUTION INTRAVENOUS DAILY PRN
Status: DISCONTINUED | OUTPATIENT
Start: 2025-06-11 | End: 2025-06-11 | Stop reason: HOSPADM

## 2025-06-11 RX ORDER — FENTANYL CITRATE 50 UG/ML
INJECTION, SOLUTION INTRAMUSCULAR; INTRAVENOUS
Status: DISCONTINUED | OUTPATIENT
Start: 2025-06-11 | End: 2025-06-11

## 2025-06-11 RX ORDER — TAMSULOSIN HYDROCHLORIDE 0.4 MG/1
0.4 CAPSULE ORAL DAILY
Qty: 5 CAPSULE | Refills: 0 | Status: SHIPPED | OUTPATIENT
Start: 2025-06-11 | End: 2025-06-16

## 2025-06-11 RX ORDER — ONDANSETRON HYDROCHLORIDE 2 MG/ML
INJECTION, SOLUTION INTRAVENOUS
Status: DISCONTINUED | OUTPATIENT
Start: 2025-06-11 | End: 2025-06-11

## 2025-06-11 RX ORDER — PROPOFOL 10 MG/ML
VIAL (ML) INTRAVENOUS
Status: DISCONTINUED | OUTPATIENT
Start: 2025-06-11 | End: 2025-06-11

## 2025-06-11 RX ORDER — CEFAZOLIN 2 G/1
2 INJECTION, POWDER, FOR SOLUTION INTRAMUSCULAR; INTRAVENOUS
Status: COMPLETED | OUTPATIENT
Start: 2025-06-11 | End: 2025-06-11

## 2025-06-11 RX ORDER — DEXAMETHASONE SODIUM PHOSPHATE 4 MG/ML
INJECTION, SOLUTION INTRA-ARTICULAR; INTRALESIONAL; INTRAMUSCULAR; INTRAVENOUS; SOFT TISSUE
Status: DISCONTINUED | OUTPATIENT
Start: 2025-06-11 | End: 2025-06-11

## 2025-06-11 RX ORDER — OXYBUTYNIN CHLORIDE 5 MG/1
5 TABLET ORAL 3 TIMES DAILY PRN
Qty: 15 TABLET | Refills: 0 | Status: SHIPPED | OUTPATIENT
Start: 2025-06-11 | End: 2025-06-16

## 2025-06-11 RX ORDER — LIDOCAINE HYDROCHLORIDE 20 MG/ML
INJECTION, SOLUTION EPIDURAL; INFILTRATION; INTRACAUDAL; PERINEURAL
Status: DISCONTINUED | OUTPATIENT
Start: 2025-06-11 | End: 2025-06-11

## 2025-06-11 RX ORDER — ROCURONIUM BROMIDE 10 MG/ML
INJECTION, SOLUTION INTRAVENOUS
Status: DISCONTINUED | OUTPATIENT
Start: 2025-06-11 | End: 2025-06-11

## 2025-06-11 RX ORDER — PHENAZOPYRIDINE HYDROCHLORIDE 200 MG/1
200 TABLET, FILM COATED ORAL 3 TIMES DAILY PRN
Qty: 15 TABLET | Refills: 0 | Status: SHIPPED | OUTPATIENT
Start: 2025-06-11 | End: 2025-06-16

## 2025-06-11 RX ADMIN — SODIUM CHLORIDE: 0.9 INJECTION, SOLUTION INTRAVENOUS at 12:06

## 2025-06-11 RX ADMIN — SUGAMMADEX 200 MG: 100 INJECTION, SOLUTION INTRAVENOUS at 01:06

## 2025-06-11 RX ADMIN — MIDAZOLAM HYDROCHLORIDE 2 MG: 2 INJECTION, SOLUTION INTRAMUSCULAR; INTRAVENOUS at 12:06

## 2025-06-11 RX ADMIN — DEXAMETHASONE SODIUM PHOSPHATE 4 MG: 4 INJECTION, SOLUTION INTRAMUSCULAR; INTRAVENOUS at 01:06

## 2025-06-11 RX ADMIN — ONDANSETRON 4 MG: 2 INJECTION INTRAMUSCULAR; INTRAVENOUS at 01:06

## 2025-06-11 RX ADMIN — LIDOCAINE HYDROCHLORIDE 100 MG: 20 INJECTION, SOLUTION EPIDURAL; INFILTRATION; INTRACAUDAL; PERINEURAL at 12:06

## 2025-06-11 RX ADMIN — CEFAZOLIN 2 G: 2 INJECTION, POWDER, FOR SOLUTION INTRAMUSCULAR; INTRAVENOUS at 01:06

## 2025-06-11 RX ADMIN — FENTANYL CITRATE 100 MCG: 50 INJECTION, SOLUTION INTRAMUSCULAR; INTRAVENOUS at 12:06

## 2025-06-11 RX ADMIN — PROPOFOL 150 MG: 10 INJECTION, EMULSION INTRAVENOUS at 12:06

## 2025-06-11 RX ADMIN — ROCURONIUM BROMIDE 50 MG: 10 INJECTION, SOLUTION INTRAVENOUS at 12:06

## 2025-06-11 NOTE — INTERVAL H&P NOTE
The patient has been examined and the H&P has been reviewed:    I concur with the findings and no changes have occurred since H&P was written.    Surgery risks, benefits and alternative options discussed and understood by patient/family.    Urine culture 6/3 NG      Active Problem List with Overview Notes    Diagnosis Date Noted    Constipation 05/24/2025    Right ureteral stone 05/23/2025    Type 2 diabetes mellitus, without long-term current use of insulin 05/23/2025    LADI (acute kidney injury) 05/23/2025    Weight loss 06/17/2020    Leiomyoma of uterus     Nuclear sclerosis of left eye 09/24/2018    Gastroesophageal reflux disease 05/24/2017

## 2025-06-11 NOTE — PLAN OF CARE
Discharge instructions reviewed with pt's daughter, Marcela Mariee, over the phone. Verbalized understanding. Packet will be given to pt's  to take home. Meds to be delivered to bedside.

## 2025-06-11 NOTE — DISCHARGE INSTRUCTIONS
Post Cystoscopy Instructions  Do not strain to have a bowel movement  No strenuous exercise x 7 days  No driving while you are on narcotic pain medications or if your hurley  catheter is in place    You can expect:  To pass stone fragments if you had a stone procedure  Have pain when you void from your stent if you have a stent in place  See blood in your urine if you have a stent in place    If you have a catheter, please return to the ER if your catheter stops draining or you are having abdominal pain.    Call the doctor if:  Temperature is greater than 101F  Persistent vomiting and inability to keep food down  Inability to void if you do not have a catheter

## 2025-06-11 NOTE — TRANSFER OF CARE
Anesthesia Transfer of Care Note    Patient: Krishna Mariee    Procedure(s) Performed: Procedure(s) (LRB):  CYSTOSCOPY, WITH URETERAL STENT REMOVAL (Right)  PLACEMENT-STENT (Right)  URETEROSCOPY, WITH LASER LITHOTRIPSY (Right)  REMOVAL, CALCULUS, URETER, URETEROSCOPIC (Right)    Patient location: [facility]    Anesthesia Type: general    Transport from OR: Transported from OR on 6-10 L/min O2 by face mask with adequate spontaneous ventilation    Post pain: adequate analgesia    Post assessment: no apparent anesthetic complications and tolerated procedure well    Post vital signs: stable    Level of consciousness: awake and alert    Nausea/Vomiting: no nausea/vomiting    Complications: none    Transfer of care protocol was followed      Last vitals: Visit Vitals  BP (!) 159/74 (BP Location: Left arm, Patient Position: Lying)   Pulse 67   Temp 36.4 °C (97.5 °F) (Temporal)   Resp 20   SpO2 100%   Breastfeeding No

## 2025-06-11 NOTE — BRIEF OP NOTE
[facility] - Surgery ([facility])  Brief Operative Note    Surgery Date: 6/11/2025     Surgeons and Role:     * Jimmie Nguyen MD - Primary     * Zack Vuong MD - Resident - Assisting     * Willie Salinas MD - Resident - Assisting        Pre-op Diagnosis:  Right ureteral stone [N20.1]  S/P ureteral stent placement [Z96.0]    Post-op Diagnosis:  Post-Op Diagnosis Codes:     * Right ureteral stone [N20.1]     * S/P ureteral stent placement [Z96.0]    Procedure(s) (LRB):  CYSTOSCOPY, WITH URETERAL STENT REMOVAL (Right)  PLACEMENT-STENT (Right)  URETEROSCOPY, WITH LASER LITHOTRIPSY (Right)  REMOVAL, CALCULUS, URETER, URETEROSCOPIC (Right)    Anesthesia: General    Operative Findings: large proximal right ureteral stone seen, fragmented with laser, extracted with basket. 6x26 stent with strings placed at end of case.     Estimated Blood Loss: min         Specimens:   Specimen (24h ago, onward)      None            ID Type Source Tests Collected by Time Destination   A : right ureteral stone Stone Kidney, Right URINARY STONE ANALYSIS Jimmie Nguyen MD 6/11/2025 1309            Discharge Note    OUTCOME: Patient tolerated treatment/procedure well without complication and is now ready for discharge.    DISPOSITION: Home or Self Care    FINAL DIAGNOSIS:  same as above    FOLLOWUP: In clinic    DISCHARGE INSTRUCTIONS:  patient can be discharged home

## 2025-06-11 NOTE — PLAN OF CARE
Pre-op questions answered. Pt oriented to room, call light within reach. Son dropped off,  Zachary picking up. Asked to call daughter after procedure to explain how procedure went, number on stop sign. All belongings placed in locker. Instructed to call with questions or concerns. Will continue to monitor.

## 2025-06-11 NOTE — ANESTHESIA PREPROCEDURE EVALUATION
06/11/2025  Krishna Mariee is a 70 y.o., female.      Pre-op Assessment          Review of Systems  Anesthesia Hx:  No problems with previous Anesthesia                Cardiovascular:      Denies Hypertension.    Denies CAD.                                          Pulmonary:     Denies Asthma.     Denies Sleep Apnea.                Renal/:   Denies Chronic Renal Disease.                Hepatic/GI:   Denies PUD.  GERD Denies Liver Disease.               Neurological:    Denies CVA.    Denies Seizures.                                Endocrine:  Diabetes Denies Hypothyroidism.              Physical Exam  General: Alert    Airway:  Mallampati: II / I  Mouth Opening: Normal  TM Distance: Normal  Tongue: Normal  Neck ROM: Normal ROM    Dental:  Caps / Implants        Anesthesia Plan  Type of Anesthesia, risks & benefits discussed:    Anesthesia Type: Gen ETT, Gen Natural Airway, MAC, Gen Supraglottic Airway  Intra-op Monitoring Plan: Standard ASA Monitors  Post Op Pain Control Plan: multimodal analgesia and IV/PO Opioids PRN  Induction:  IV  Airway Plan: Direct  Informed Consent: Informed consent signed with the Patient and all parties understand the risks and agree with anesthesia plan.  All questions answered.   ASA Score: 2    Ready For Surgery From Anesthesia Perspective.     .

## 2025-06-11 NOTE — OP NOTE
[facility]  Operative Note    Date: 06/11/2025    Pre-Op Diagnosis: right ureteral stone    Patient Active Problem List    Diagnosis Date Noted    Constipation 05/24/2025    Right ureteral stone 05/23/2025    Type 2 diabetes mellitus, without long-term current use of insulin 05/23/2025    LADI (acute kidney injury) 05/23/2025    Weight loss 06/17/2020    Leiomyoma of uterus     Nuclear sclerosis of left eye 09/24/2018    Gastroesophageal reflux disease 05/24/2017       Post-Op Diagnosis: same    Procedure(s) Performed:   1. Right ureteroscopy with laser lithotripsy  2. Cystoscopy  3. Removal of right ureteral stent  4. Placement of right ureteral stent   5. Fluoro < 1 hr    Specimen(s): stone for analysis     Staff Surgeon: Jimmie Nguyen MD    Assistant Surgeon: Willie Salinas MD    Anesthesia: General endotracheal anesthesia    Indications: Krishna Mariee is a 70 y.o. female with a right ureteral stone presenting for definitive stone management. She is pre-stented.    Findings:  1. Stone not visible on  film.  2.large proximal right ureteral stone seen, fragmented with laser, extracted with basket. 6x26 stent with strings placed at end of case.     Estimated Blood Loss: min    Drains:   1. Right 6 Fr x 26 cm JJ ureteral stent with strings    Procedure in detail:  After risks, benefits, and possible complications were explained, the patient elected to undergo the procedure and informed consent was obtained. All questions were answered in the kosta-operative area. The patient was transferred to the cystoscopy suite and placed in the supine position. SCDs were applied and working. Anesthesia was administered. The patient was then placed in the dorsal lithotomy position and prepped and draped in the usual sterile fashion. Time out was performed, and kosta-procedural antibiotics were confirmed.     The stone was not on  film. A rigid cystoscope in a 22 Fr sheath was introduced into the patient's  urethra. This passed easily. The entire urethra was visualized which showed no strictures or masses. Cystoscopy revealed the ureteral orifices in the normal anatomic location bilaterally.    The patient's right ureteral stent was grasped with alligator graspers and brought to the meatus. A motion wire was passed through the stent and into the kidney with fluoroscopic confirmation. The stent was was removed keeping the wire in place.    An 8 Fr rigid ureteroscope was passed into the patient's bladder alongside the wire under direct vision. It was then passed through the right ureteral orifice alongside the wire. A stone was encountered at the level of the proximal ureter.  A 273 micron thulium laser fiber was passed through the ureteroscope. The stone was fragmented using the laser. The laser fiber was removed and an NCircle basket was introduced through the ureteroscope. Stone fragments were removed and placed in the bladder. The ureteroscope was reinserted and the entire length of the ureter was surveyed and no additional stone fragments were visualized.     The cystoscope was reinserted and the bladder was irrigated to remove the stone fragments. The bladder was drained and the cystoscope removed keeping the wire in place.    A 6 Fr x 26 cm JJ ureteral stent with strings was passed over the wire and up into the renal pelvis using fluoro. When the coil appeared to be in good position in the kidney and the radio-opaque marker of the pusher was at the inferior pubis, the wire was removed under continuous fluoro. Good coils were seen in the kidney and the bladder using fluoro.The patient tolerated the procedure well and was transferred to the recovery room in stable condition.    Disposition:  The patient will be discharged home from PACU. She follow up with Dr. Patrick abbasi in 3 months with a KUB and renal ultrasound prior. She was given written instructions to self-remove her ureteral stent with strings on Monday,  6/16/2025.    Willie Salinas MD

## 2025-06-12 NOTE — ANESTHESIA POSTPROCEDURE EVALUATION
Anesthesia Post Evaluation    Patient: Krishna Mariee    Procedure(s) Performed: Procedure(s) (LRB):  CYSTOSCOPY, WITH URETERAL STENT REMOVAL (Right)  PLACEMENT-STENT (Right)  URETEROSCOPY, WITH LASER LITHOTRIPSY (Right)  REMOVAL, CALCULUS, URETER, URETEROSCOPIC (Right)    Final Anesthesia Type: general      Patient location during evaluation: PACU  Patient participation: Yes- Able to Participate  Level of consciousness: awake  Post-procedure vital signs: reviewed and stable  Pain management: adequate  Airway patency: patent    PONV status at discharge: No PONV  Anesthetic complications: no      Cardiovascular status: blood pressure returned to baseline  Respiratory status: unassisted  Hydration status: euvolemic  Follow-up not needed.              Vitals Value Taken Time   /69 06/11/25 14:30   Temp 36.4 °C (97.5 °F) 06/11/25 13:48   Pulse 74 06/11/25 14:30   Resp 20 06/11/25 14:30   SpO2 98 % 06/11/25 14:30         No case tracking events are documented in the log.      Pain/Caroline Score: Caroline Score: 10 (6/11/2025  2:00 PM)

## 2025-06-13 VITALS
DIASTOLIC BLOOD PRESSURE: 69 MMHG | SYSTOLIC BLOOD PRESSURE: 159 MMHG | RESPIRATION RATE: 20 BRPM | OXYGEN SATURATION: 98 % | HEART RATE: 74 BPM | TEMPERATURE: 98 F

## (undated) DEVICE — SOL WATER STRL IRR 1000ML

## (undated) DEVICE — SALINE INTRAVENOUS 1L VITEK2

## (undated) DEVICE — SOL BETADINE 5%

## (undated) DEVICE — CASSETTE INFINITI

## (undated) DEVICE — PACK CYSTOSCOPY III SIRUS

## (undated) DEVICE — TRAY CYSTO BASIN OMC

## (undated) DEVICE — SCRUB 10% POVIDONE IODINE 4OZ

## (undated) DEVICE — GLOVE BIOGEL SKINSENSE PI 7.5

## (undated) DEVICE — UNDERGLOVES BIOGEL PI SIZE 7.5

## (undated) DEVICE — CATH POLLACK OPEN-END FLEXI-TI

## (undated) DEVICE — EXTRACTOR TIPLESS 2.4FRX1115CM

## (undated) DEVICE — SYR ONLY LUER LOCK 20CC

## (undated) DEVICE — GUIDE WIRE MOTION .035 X 150CM

## (undated) DEVICE — UNDERGLOVES BIOGEL PI SZ 7 LF

## (undated) DEVICE — ADAPTER HOSE 10FT 8MM

## (undated) DEVICE — SOL NACL IRR 3000ML

## (undated) DEVICE — FIBER QUANTA OPT SDT 272UM

## (undated) DEVICE — SOL NACL 0.9% IV INJ 1000ML

## (undated) DEVICE — SYR 10CC LUER LOCK

## (undated) DEVICE — GLOVE BIOGEL SKINSENSE PI 6.5

## (undated) DEVICE — Device

## (undated) DEVICE — GOWN X-LG STERILE BACK

## (undated) DEVICE — SET CYSTO IRRIGATION UNIV SPIK

## (undated) DEVICE — SOL IRRI STRL WATER 1000ML